# Patient Record
Sex: FEMALE | Race: BLACK OR AFRICAN AMERICAN | NOT HISPANIC OR LATINO | Employment: OTHER | RURAL
[De-identification: names, ages, dates, MRNs, and addresses within clinical notes are randomized per-mention and may not be internally consistent; named-entity substitution may affect disease eponyms.]

---

## 2021-05-17 ENCOUNTER — HOSPITAL ENCOUNTER (OUTPATIENT)
Dept: RADIOLOGY | Facility: HOSPITAL | Age: 76
Discharge: HOME | End: 2021-05-17
Attending: ORTHOPAEDIC SURGERY
Payer: COMMERCIAL

## 2021-05-17 DIAGNOSIS — M25.561 ACUTE PAIN OF RIGHT KNEE: ICD-10-CM

## 2021-05-17 PROCEDURE — 73564 X-RAY EXAM KNEE 4 OR MORE: CPT | Mod: TC,RT

## 2021-05-24 PROBLEM — S82.031D CLOSED DISPLACED TRANSVERSE FRACTURE OF RIGHT PATELLA WITH ROUTINE HEALING: Status: ACTIVE | Noted: 2021-05-24

## 2021-05-24 PROBLEM — Z47.89 ENCOUNTER FOR ORTHOPEDIC FOLLOW-UP CARE: Status: ACTIVE | Noted: 2021-05-24

## 2021-06-07 ENCOUNTER — HOSPITAL ENCOUNTER (OUTPATIENT)
Dept: RADIOLOGY | Facility: HOSPITAL | Age: 76
Discharge: HOME | End: 2021-06-07
Attending: NURSE PRACTITIONER
Payer: COMMERCIAL

## 2021-06-07 DIAGNOSIS — Z78.0 ASYMPTOMATIC AGE-RELATED POSTMENOPAUSAL STATE: ICD-10-CM

## 2021-06-07 PROCEDURE — 77080 DXA BONE DENSITY AXIAL: CPT | Mod: TC

## 2021-06-09 PROBLEM — T84.84XA PAINFUL ORTHOPAEDIC HARDWARE: Status: ACTIVE | Noted: 2021-06-09

## 2021-06-10 ENCOUNTER — ANESTHESIA (OUTPATIENT)
Dept: SURGERY | Facility: HOSPITAL | Age: 76
End: 2021-06-10
Payer: COMMERCIAL

## 2021-06-10 ENCOUNTER — ANESTHESIA EVENT (OUTPATIENT)
Dept: SURGERY | Facility: HOSPITAL | Age: 76
End: 2021-06-10
Payer: COMMERCIAL

## 2021-06-10 ENCOUNTER — HOSPITAL ENCOUNTER (OUTPATIENT)
Facility: HOSPITAL | Age: 76
Discharge: HOME | End: 2021-06-10
Attending: ORTHOPAEDIC SURGERY | Admitting: ORTHOPAEDIC SURGERY
Payer: COMMERCIAL

## 2021-06-10 VITALS
HEART RATE: 77 BPM | SYSTOLIC BLOOD PRESSURE: 152 MMHG | HEIGHT: 62 IN | DIASTOLIC BLOOD PRESSURE: 68 MMHG | BODY MASS INDEX: 22.63 KG/M2 | RESPIRATION RATE: 16 BRPM | WEIGHT: 123 LBS | OXYGEN SATURATION: 98 % | TEMPERATURE: 97 F

## 2021-06-10 DIAGNOSIS — S82.031D CLOSED DISPLACED TRANSVERSE FRACTURE OF RIGHT PATELLA WITH ROUTINE HEALING: ICD-10-CM

## 2021-06-10 DIAGNOSIS — I10 HTN (HYPERTENSION): ICD-10-CM

## 2021-06-10 DIAGNOSIS — T84.84XA PAINFUL ORTHOPAEDIC HARDWARE: ICD-10-CM

## 2021-06-10 DIAGNOSIS — Z47.89 ENCOUNTER FOR ORTHOPEDIC FOLLOW-UP CARE: ICD-10-CM

## 2021-06-10 LAB
GLUCOSE SERPL-MCNC: 125 MG/DL (ref 70–105)
GLUCOSE SERPL-MCNC: 63 MG/DL (ref 70–105)
GLUCOSE SERPL-MCNC: 85 MG/DL (ref 70–105)
GLUCOSE SERPL-MCNC: 86 MG/DL (ref 70–105)

## 2021-06-10 PROCEDURE — 27000177 HC AIRWAY, LARYNGEAL MASK: Performed by: ANESTHESIOLOGY

## 2021-06-10 PROCEDURE — 27000655: Performed by: ANESTHESIOLOGY

## 2021-06-10 PROCEDURE — 71000016 HC POSTOP RECOV ADDL HR: Performed by: ORTHOPAEDIC SURGERY

## 2021-06-10 PROCEDURE — 93010 EKG 12-LEAD: ICD-10-PCS | Mod: ,,, | Performed by: INTERNAL MEDICINE

## 2021-06-10 PROCEDURE — 37000009 HC ANESTHESIA EA ADD 15 MINS: Performed by: ORTHOPAEDIC SURGERY

## 2021-06-10 PROCEDURE — 25000003 PHARM REV CODE 250: Performed by: ORTHOPAEDIC SURGERY

## 2021-06-10 PROCEDURE — D9220A PRA ANESTHESIA: ICD-10-PCS | Mod: ANES,,, | Performed by: ANESTHESIOLOGY

## 2021-06-10 PROCEDURE — 25000003 PHARM REV CODE 250: Performed by: NURSE ANESTHETIST, CERTIFIED REGISTERED

## 2021-06-10 PROCEDURE — 27000510 HC BLANKET BAIR HUGGER ANY SIZE: Performed by: ANESTHESIOLOGY

## 2021-06-10 PROCEDURE — 36000707: Performed by: ORTHOPAEDIC SURGERY

## 2021-06-10 PROCEDURE — 36000706: Performed by: ORTHOPAEDIC SURGERY

## 2021-06-10 PROCEDURE — 37000008 HC ANESTHESIA 1ST 15 MINUTES: Performed by: ORTHOPAEDIC SURGERY

## 2021-06-10 PROCEDURE — D9220A PRA ANESTHESIA: ICD-10-PCS | Mod: CRNA,,, | Performed by: NURSE ANESTHETIST, CERTIFIED REGISTERED

## 2021-06-10 PROCEDURE — 27000716 HC OXISENSOR PROBE, ANY SIZE: Performed by: ANESTHESIOLOGY

## 2021-06-10 PROCEDURE — D9220A PRA ANESTHESIA: Mod: CRNA,,, | Performed by: NURSE ANESTHETIST, CERTIFIED REGISTERED

## 2021-06-10 PROCEDURE — 82962 GLUCOSE BLOOD TEST: CPT

## 2021-06-10 PROCEDURE — 71000015 HC POSTOP RECOV 1ST HR: Performed by: ORTHOPAEDIC SURGERY

## 2021-06-10 PROCEDURE — 27000260 *HC AIRWAY ORAL: Performed by: ANESTHESIOLOGY

## 2021-06-10 PROCEDURE — 71000033 HC RECOVERY, INTIAL HOUR: Performed by: ORTHOPAEDIC SURGERY

## 2021-06-10 PROCEDURE — 63600175 PHARM REV CODE 636 W HCPCS: Performed by: NURSE ANESTHETIST, CERTIFIED REGISTERED

## 2021-06-10 PROCEDURE — D9220A PRA ANESTHESIA: Mod: ANES,,, | Performed by: ANESTHESIOLOGY

## 2021-06-10 PROCEDURE — 93010 ELECTROCARDIOGRAM REPORT: CPT | Mod: ,,, | Performed by: INTERNAL MEDICINE

## 2021-06-10 PROCEDURE — 93005 ELECTROCARDIOGRAM TRACING: CPT

## 2021-06-10 PROCEDURE — 25000003 PHARM REV CODE 250: Performed by: ANESTHESIOLOGY

## 2021-06-10 PROCEDURE — 63600175 PHARM REV CODE 636 W HCPCS: Performed by: ANESTHESIOLOGY

## 2021-06-10 PROCEDURE — 27201423 OPTIME MED/SURG SUP & DEVICES STERILE SUPPLY: Performed by: ORTHOPAEDIC SURGERY

## 2021-06-10 RX ORDER — ONDANSETRON 2 MG/ML
4 INJECTION INTRAMUSCULAR; INTRAVENOUS DAILY PRN
Status: DISCONTINUED | OUTPATIENT
Start: 2021-06-10 | End: 2021-06-10 | Stop reason: HOSPADM

## 2021-06-10 RX ORDER — FENTANYL CITRATE 50 UG/ML
INJECTION, SOLUTION INTRAMUSCULAR; INTRAVENOUS
Status: DISCONTINUED | OUTPATIENT
Start: 2021-06-10 | End: 2021-06-10

## 2021-06-10 RX ORDER — CEFAZOLIN SODIUM 1 G/3ML
INJECTION, POWDER, FOR SOLUTION INTRAMUSCULAR; INTRAVENOUS
Status: DISCONTINUED | OUTPATIENT
Start: 2021-06-10 | End: 2021-06-10

## 2021-06-10 RX ORDER — PROPOFOL 10 MG/ML
VIAL (ML) INTRAVENOUS
Status: DISCONTINUED | OUTPATIENT
Start: 2021-06-10 | End: 2021-06-10

## 2021-06-10 RX ORDER — SODIUM CHLORIDE, SODIUM LACTATE, POTASSIUM CHLORIDE, CALCIUM CHLORIDE 600; 310; 30; 20 MG/100ML; MG/100ML; MG/100ML; MG/100ML
INJECTION, SOLUTION INTRAVENOUS CONTINUOUS
Status: DISCONTINUED | OUTPATIENT
Start: 2021-06-10 | End: 2021-06-10 | Stop reason: HOSPADM

## 2021-06-10 RX ORDER — DEXAMETHASONE SODIUM PHOSPHATE 100 MG/10ML
INJECTION INTRAMUSCULAR; INTRAVENOUS
Status: DISCONTINUED | OUTPATIENT
Start: 2021-06-10 | End: 2021-06-10

## 2021-06-10 RX ORDER — SODIUM CHLORIDE, SODIUM LACTATE, POTASSIUM CHLORIDE, CALCIUM CHLORIDE 600; 310; 30; 20 MG/100ML; MG/100ML; MG/100ML; MG/100ML
125 INJECTION, SOLUTION INTRAVENOUS CONTINUOUS
Status: DISCONTINUED | OUTPATIENT
Start: 2021-06-10 | End: 2021-06-10 | Stop reason: HOSPADM

## 2021-06-10 RX ORDER — TRAMADOL HYDROCHLORIDE 50 MG/1
50 TABLET ORAL EVERY 8 HOURS PRN
Qty: 30 TABLET | Refills: 0 | Status: SHIPPED | OUTPATIENT
Start: 2021-06-10

## 2021-06-10 RX ORDER — SODIUM CHLORIDE 9 MG/ML
INJECTION, SOLUTION INTRAVENOUS CONTINUOUS
Status: DISPENSED | OUTPATIENT
Start: 2021-06-10

## 2021-06-10 RX ORDER — ONDANSETRON 4 MG/1
8 TABLET, ORALLY DISINTEGRATING ORAL EVERY 8 HOURS PRN
Status: DISCONTINUED | OUTPATIENT
Start: 2021-06-10 | End: 2021-06-10 | Stop reason: HOSPADM

## 2021-06-10 RX ORDER — ONDANSETRON 2 MG/ML
INJECTION INTRAMUSCULAR; INTRAVENOUS
Status: DISCONTINUED | OUTPATIENT
Start: 2021-06-10 | End: 2021-06-10

## 2021-06-10 RX ORDER — LIDOCAINE HYDROCHLORIDE 20 MG/ML
INJECTION, SOLUTION EPIDURAL; INFILTRATION; INTRACAUDAL; PERINEURAL
Status: DISCONTINUED | OUTPATIENT
Start: 2021-06-10 | End: 2021-06-10

## 2021-06-10 RX ORDER — LIDOCAINE HYDROCHLORIDE 10 MG/ML
1 INJECTION, SOLUTION EPIDURAL; INFILTRATION; INTRACAUDAL; PERINEURAL ONCE
Status: DISCONTINUED | OUTPATIENT
Start: 2021-06-10 | End: 2021-06-10 | Stop reason: HOSPADM

## 2021-06-10 RX ORDER — HYDROCODONE BITARTRATE AND ACETAMINOPHEN 5; 325 MG/1; MG/1
1 TABLET ORAL EVERY 4 HOURS PRN
Status: DISCONTINUED | OUTPATIENT
Start: 2021-06-10 | End: 2021-06-10 | Stop reason: HOSPADM

## 2021-06-10 RX ORDER — CEFAZOLIN SODIUM 2 G/50ML
2 SOLUTION INTRAVENOUS
Status: ACTIVE | OUTPATIENT
Start: 2021-06-10

## 2021-06-10 RX ORDER — OXYCODONE HYDROCHLORIDE 5 MG/1
5 TABLET ORAL
Status: DISCONTINUED | OUTPATIENT
Start: 2021-06-10 | End: 2021-06-10 | Stop reason: HOSPADM

## 2021-06-10 RX ORDER — PROMETHAZINE HYDROCHLORIDE 25 MG/1
25 TABLET ORAL EVERY 6 HOURS PRN
Status: DISCONTINUED | OUTPATIENT
Start: 2021-06-10 | End: 2021-06-10 | Stop reason: HOSPADM

## 2021-06-10 RX ORDER — DEXTROSE 50 % IN WATER (D50W) INTRAVENOUS SYRINGE
25 ONCE
Status: COMPLETED | OUTPATIENT
Start: 2021-06-10 | End: 2021-06-10

## 2021-06-10 RX ORDER — MORPHINE SULFATE 10 MG/ML
4 INJECTION INTRAMUSCULAR; INTRAVENOUS; SUBCUTANEOUS EVERY 5 MIN PRN
Status: DISCONTINUED | OUTPATIENT
Start: 2021-06-10 | End: 2021-06-10 | Stop reason: HOSPADM

## 2021-06-10 RX ORDER — DIPHENHYDRAMINE HYDROCHLORIDE 50 MG/ML
25 INJECTION INTRAMUSCULAR; INTRAVENOUS EVERY 6 HOURS PRN
Status: DISCONTINUED | OUTPATIENT
Start: 2021-06-10 | End: 2021-06-10 | Stop reason: HOSPADM

## 2021-06-10 RX ORDER — HYDROMORPHONE HYDROCHLORIDE 2 MG/ML
0.5 INJECTION, SOLUTION INTRAMUSCULAR; INTRAVENOUS; SUBCUTANEOUS EVERY 5 MIN PRN
Status: DISCONTINUED | OUTPATIENT
Start: 2021-06-10 | End: 2021-06-10 | Stop reason: HOSPADM

## 2021-06-10 RX ORDER — ACETAMINOPHEN 500 MG
1000 TABLET ORAL EVERY 6 HOURS PRN
Status: DISCONTINUED | OUTPATIENT
Start: 2021-06-10 | End: 2021-06-10 | Stop reason: HOSPADM

## 2021-06-10 RX ORDER — PHENYLEPHRINE HYDROCHLORIDE 10 MG/ML
INJECTION INTRAVENOUS
Status: DISCONTINUED | OUTPATIENT
Start: 2021-06-10 | End: 2021-06-10

## 2021-06-10 RX ORDER — MEPERIDINE HYDROCHLORIDE 25 MG/ML
25 INJECTION INTRAMUSCULAR; INTRAVENOUS; SUBCUTANEOUS EVERY 10 MIN PRN
Status: DISCONTINUED | OUTPATIENT
Start: 2021-06-10 | End: 2021-06-10 | Stop reason: HOSPADM

## 2021-06-10 RX ORDER — DEXTROSE 50 % IN WATER (D50W) INTRAVENOUS SYRINGE
12.5 ONCE
Status: DISCONTINUED | OUTPATIENT
Start: 2021-06-10 | End: 2021-06-10 | Stop reason: HOSPADM

## 2021-06-10 RX ADMIN — PHENYLEPHRINE HYDROCHLORIDE 100 MCG: 10 INJECTION INTRAVENOUS at 05:06

## 2021-06-10 RX ADMIN — FENTANYL CITRATE 25 MCG: 50 INJECTION INTRAMUSCULAR; INTRAVENOUS at 05:06

## 2021-06-10 RX ADMIN — SODIUM CHLORIDE: 9 INJECTION, SOLUTION INTRAVENOUS at 01:06

## 2021-06-10 RX ADMIN — CEFAZOLIN 2 G: 1 INJECTION, POWDER, FOR SOLUTION INTRAMUSCULAR; INTRAVENOUS; PARENTERAL at 05:06

## 2021-06-10 RX ADMIN — PROPOFOL 100 MG: 10 INJECTION, EMULSION INTRAVENOUS at 05:06

## 2021-06-10 RX ADMIN — LIDOCAINE HYDROCHLORIDE 50 MG: 20 INJECTION, SOLUTION INTRAVENOUS at 05:06

## 2021-06-10 RX ADMIN — SODIUM CHLORIDE: 9 INJECTION, SOLUTION INTRAVENOUS at 05:06

## 2021-06-10 RX ADMIN — DEXTROSE MONOHYDRATE 12.5 G: 25 INJECTION, SOLUTION INTRAVENOUS at 02:06

## 2021-06-10 RX ADMIN — ONDANSETRON 4 MG: 2 INJECTION INTRAMUSCULAR; INTRAVENOUS at 05:06

## 2021-06-10 RX ADMIN — DEXAMETHASONE SODIUM PHOSPHATE 8 MG: 10 INJECTION INTRAMUSCULAR; INTRAVENOUS at 05:06

## 2021-06-10 RX ADMIN — HYDROMORPHONE HYDROCHLORIDE 0.5 MG: 2 INJECTION INTRAMUSCULAR; INTRAVENOUS; SUBCUTANEOUS at 05:06

## 2021-07-07 ENCOUNTER — HOSPITAL ENCOUNTER (OUTPATIENT)
Dept: RADIOLOGY | Facility: HOSPITAL | Age: 76
Discharge: HOME | End: 2021-07-07
Attending: ORTHOPAEDIC SURGERY
Payer: COMMERCIAL

## 2021-07-07 DIAGNOSIS — Z47.89 ENCOUNTER FOR ORTHOPEDIC FOLLOW-UP CARE: ICD-10-CM

## 2021-07-07 PROBLEM — S82.031D CLOSED DISPLACED TRANSVERSE FRACTURE OF RIGHT PATELLA WITH ROUTINE HEALING: Status: RESOLVED | Noted: 2021-05-24 | Resolved: 2021-07-07

## 2021-07-07 PROBLEM — T84.84XA PAINFUL ORTHOPAEDIC HARDWARE: Status: RESOLVED | Noted: 2021-06-09 | Resolved: 2021-07-07

## 2021-07-07 PROCEDURE — 73560 X-RAY EXAM OF KNEE 1 OR 2: CPT | Mod: TC,RT

## 2022-01-11 ENCOUNTER — HOSPITAL ENCOUNTER (OUTPATIENT)
Dept: RADIOLOGY | Facility: HOSPITAL | Age: 77
Discharge: HOME | End: 2022-01-11
Attending: NURSE PRACTITIONER
Payer: COMMERCIAL

## 2022-01-11 VITALS — BODY MASS INDEX: 22.63 KG/M2 | HEIGHT: 62 IN | WEIGHT: 123 LBS

## 2022-01-11 DIAGNOSIS — Z12.31 BREAST CANCER SCREENING BY MAMMOGRAM: ICD-10-CM

## 2022-01-11 PROCEDURE — 77067 MAMMO DIGITAL SCREENING BILAT: ICD-10-PCS | Mod: 26,,, | Performed by: RADIOLOGY

## 2022-01-11 PROCEDURE — 77067 SCR MAMMO BI INCL CAD: CPT | Mod: TC

## 2022-01-11 PROCEDURE — 77067 SCR MAMMO BI INCL CAD: CPT | Mod: 26,,, | Performed by: RADIOLOGY

## 2022-02-23 ENCOUNTER — HOSPITAL ENCOUNTER (OUTPATIENT)
Dept: RADIOLOGY | Facility: HOSPITAL | Age: 77
Discharge: HOME | End: 2022-02-23
Attending: NURSE PRACTITIONER
Payer: COMMERCIAL

## 2022-02-23 DIAGNOSIS — R74.01 ELEVATION OF LEVELS OF LIVER TRANSAMINASE LEVELS: ICD-10-CM

## 2022-02-23 PROCEDURE — 76705 ECHO EXAM OF ABDOMEN: CPT | Mod: TC

## 2023-08-19 ENCOUNTER — HOSPITAL ENCOUNTER (EMERGENCY)
Facility: HOSPITAL | Age: 78
Discharge: HOME | End: 2023-08-19
Payer: MEDICARE

## 2023-08-19 VITALS
TEMPERATURE: 98 F | RESPIRATION RATE: 16 BRPM | SYSTOLIC BLOOD PRESSURE: 177 MMHG | HEIGHT: 62 IN | HEART RATE: 95 BPM | DIASTOLIC BLOOD PRESSURE: 86 MMHG | OXYGEN SATURATION: 98 % | WEIGHT: 127 LBS | BODY MASS INDEX: 23.37 KG/M2

## 2023-08-19 DIAGNOSIS — E16.2 HYPOGLYCEMIA: Primary | ICD-10-CM

## 2023-08-19 LAB
ALBUMIN SERPL BCP-MCNC: 2.6 G/DL (ref 3.5–5)
ALBUMIN/GLOB SERPL: 0.6 {RATIO}
ALP SERPL-CCNC: 164 U/L (ref 55–142)
ALT SERPL W P-5'-P-CCNC: 19 U/L (ref 13–56)
ANION GAP SERPL CALCULATED.3IONS-SCNC: 17 MMOL/L (ref 7–16)
AST SERPL W P-5'-P-CCNC: 40 U/L (ref 15–37)
BASOPHILS # BLD AUTO: 0.02 K/UL (ref 0–0.2)
BASOPHILS NFR BLD AUTO: 0.4 % (ref 0–1)
BILIRUB SERPL-MCNC: 0.3 MG/DL (ref ?–1.2)
BUN SERPL-MCNC: 25 MG/DL (ref 7–18)
BUN/CREAT SERPL: 12 (ref 6–20)
CALCIUM SERPL-MCNC: 8.3 MG/DL (ref 8.5–10.1)
CHLORIDE SERPL-SCNC: 103 MMOL/L (ref 98–107)
CK SERPL-CCNC: 163 U/L (ref 26–192)
CO2 SERPL-SCNC: 22 MMOL/L (ref 21–32)
CREAT SERPL-MCNC: 2.06 MG/DL (ref 0.55–1.02)
DIFFERENTIAL METHOD BLD: ABNORMAL
EGFR (NO RACE VARIABLE) (RUSH/TITUS): 25 ML/MIN/1.73M2
EOSINOPHIL # BLD AUTO: 0.08 K/UL (ref 0–0.5)
EOSINOPHIL NFR BLD AUTO: 1.5 % (ref 1–4)
ERYTHROCYTE [DISTWIDTH] IN BLOOD BY AUTOMATED COUNT: 13.2 % (ref 11.5–14.5)
GLOBULIN SER-MCNC: 4.1 G/DL (ref 2–4)
GLUCOSE SERPL-MCNC: 224 MG/DL (ref 74–106)
HCT VFR BLD AUTO: 31.2 % (ref 38–47)
HGB BLD-MCNC: 10 G/DL (ref 12–16)
LYMPHOCYTES # BLD AUTO: 0.77 K/UL (ref 1–4.8)
LYMPHOCYTES NFR BLD AUTO: 14.9 % (ref 27–41)
MAGNESIUM SERPL-MCNC: 1.7 MG/DL (ref 1.7–2.3)
MCH RBC QN AUTO: 30.1 PG (ref 27–31)
MCHC RBC AUTO-ENTMCNC: 32.1 G/DL (ref 32–36)
MCV RBC AUTO: 94 FL (ref 80–96)
MONOCYTES # BLD AUTO: 0.36 K/UL (ref 0–0.8)
MONOCYTES NFR BLD AUTO: 6.9 % (ref 2–6)
MPC BLD CALC-MCNC: 8.2 FL (ref 9.4–12.4)
NEUTROPHILS # BLD AUTO: 3.95 K/UL (ref 1.8–7.7)
NEUTROPHILS NFR BLD AUTO: 76.3 % (ref 53–65)
PLATELET # BLD AUTO: 204 K/UL (ref 150–400)
POTASSIUM SERPL-SCNC: 4 MMOL/L (ref 3.5–5.1)
PROT SERPL-MCNC: 6.7 G/DL (ref 6.4–8.2)
RBC # BLD AUTO: 3.32 M/UL (ref 4.2–5.4)
SODIUM SERPL-SCNC: 138 MMOL/L (ref 136–145)
WBC # BLD AUTO: 5.18 K/UL (ref 4.5–11)

## 2023-08-19 PROCEDURE — 82962 GLUCOSE BLOOD TEST: CPT

## 2023-08-19 PROCEDURE — 82550 ASSAY OF CK (CPK): CPT | Performed by: REGISTERED NURSE

## 2023-08-19 PROCEDURE — 99284 EMERGENCY DEPT VISIT MOD MDM: CPT | Mod: GF

## 2023-08-19 PROCEDURE — 85025 COMPLETE CBC W/AUTO DIFF WBC: CPT | Performed by: REGISTERED NURSE

## 2023-08-19 PROCEDURE — 83735 ASSAY OF MAGNESIUM: CPT | Performed by: REGISTERED NURSE

## 2023-08-19 PROCEDURE — 99283 EMERGENCY DEPT VISIT LOW MDM: CPT

## 2023-08-19 PROCEDURE — 80053 COMPREHEN METABOLIC PANEL: CPT | Performed by: REGISTERED NURSE

## 2023-08-20 LAB
GLUCOSE SERPL-MCNC: 168 MG/DL (ref 70–105)
GLUCOSE SERPL-MCNC: 223 MG/DL (ref 70–105)

## 2023-08-20 NOTE — ED TRIAGE NOTES
76 YO FE TO ER VIA EMS WITH HYPOGLYCEMIA.  EMS REPORTS PT WAS FOUND AT HOME WITH BGL OF 23, PROBABLY HAD BEEN ON FLOOR FOR OVER 3 HOURS.  D10 WAS HUNG EN ROUTE, UPON ARRIVAL PT BGL .  PT IS ALERT AND ORIENTED X 3, DENIES C/O PAIN OR DISCOMFORT.

## 2023-08-20 NOTE — ED NOTES
PT HAS CONSUMED ONE CHICKEN SALAD SANDWICH, ONE MANDARIN,HALF A BANANA,8 OZ OF TEA AND 8OZ CUP OF COFFEE,BLACK

## 2023-08-20 NOTE — ED PROVIDER NOTES
Encounter Date: 8/19/2023       History     Chief Complaint   Patient presents with    Hypoglycemia     Jeanie Lucas is a 78 yo AAF that presents per [facility] with hypoglycemia.  EMS reports pt was found on the floor by a family member with a blood glucose of 24 mg/dL for possibly 3 hours.  Pt was transported with a D10 infusion and on arrival to ED pt was AAO X 3 with blood glucose of 223 mg/dL.  Pt states she has not eaten since 1800 the day before.  She denies pain or discomfort.    The history is provided by the patient, a relative and the EMS personnel.    EMS   Review of patient's allergies indicates:  No Known Allergies  Past Medical History:   Diagnosis Date    Diabetes mellitus, type 2     Heart disease     Hypertension     Parkinson disease      Past Surgical History:   Procedure Laterality Date    CARDIAC DEFIBRILLATOR PLACEMENT  2018    COMBINED RIGHT AND TRANSSEPTAL (EXISTING OPENING) LEFT HEART CATHETERIZATION      defibrilator      HYSTERECTOMY      REMOVAL OF HARDWARE FROM LOWER EXTREMITY Right 6/10/2021    Procedure: REMOVAL, HARDWARE, LOWER EXTREMITY, PATELLA;  Surgeon: Artis Monaco MD;  Location: [facility];  Service: Orthopedics;  Laterality: Right;     Family History   Problem Relation Age of Onset    Diabetes Mother     Heart disease Mother     Heart disease Father      Social History     Tobacco Use    Smoking status: Never    Smokeless tobacco: Never   Substance Use Topics    Alcohol use: Never    Drug use: Never     Review of Systems   Respiratory:  Negative for shortness of breath.    Cardiovascular:  Negative for chest pain.   Gastrointestinal:  Negative for abdominal pain, nausea and vomiting.   Neurological:  Negative for dizziness, weakness and headaches.   All other systems reviewed and are negative.      Physical Exam     Initial Vitals [08/19/23 1856]   BP Pulse Resp Temp SpO2   (!) 177/86 95 16 97.8 °F (36.6 °C) 98 %      MAP       --         Physical  Exam    Constitutional: She appears well-developed and well-nourished. She is not diaphoretic. She is active and cooperative.  Non-toxic appearance. She does not have a sickly appearance. She does not appear ill. No distress.   HENT:   Head: Normocephalic and atraumatic.   Right Ear: External ear normal.   Left Ear: External ear normal.   Nose: Nose normal.   Eyes: EOM are normal. Pupils are equal, round, and reactive to light.   Neck: Neck supple.   Normal range of motion.  Cardiovascular:  Normal rate, regular rhythm, normal heart sounds and intact distal pulses.           Pulmonary/Chest: Breath sounds normal. No respiratory distress.   Abdominal: Abdomen is soft. Bowel sounds are normal.   Musculoskeletal:         General: Normal range of motion.      Cervical back: Normal range of motion and neck supple.     Neurological: She is alert and oriented to person, place, and time. She has normal strength. GCS score is 15. GCS eye subscore is 4. GCS verbal subscore is 5. GCS motor subscore is 6.   Skin: Skin is warm and dry. Capillary refill takes less than 2 seconds.   Psychiatric: She has a normal mood and affect. Her behavior is normal. Judgment and thought content normal.         Medical Screening Exam   See Full Note    ED Course   Procedures  Labs Reviewed   COMPREHENSIVE METABOLIC PANEL - Abnormal; Notable for the following components:       Result Value    Anion Gap 17 (*)     Glucose 224 (*)     BUN 25 (*)     Creatinine 2.06 (*)     Calcium 8.3 (*)     Albumin 2.6 (*)     Globulin 4.1 (*)     Alk Phos 164 (*)     AST 40 (*)     eGFR 25 (*)     All other components within normal limits   CBC WITH DIFFERENTIAL - Abnormal; Notable for the following components:    RBC 3.32 (*)     Hemoglobin 10.0 (*)     Hematocrit 31.2 (*)     MPV 8.2 (*)     Neutrophils % 76.3 (*)     Lymphocytes % 14.9 (*)     Lymphocytes, Absolute 0.77 (*)     Monocytes % 6.9 (*)     All other components within normal limits   MAGNESIUM -  Normal   CK - Normal   CBC W/ AUTO DIFFERENTIAL    Narrative:     The following orders were created for panel order CBC auto differential.  Procedure                               Abnormality         Status                     ---------                               -----------         ------                     CBC with Differential[[id number]]        Abnormal            Final result                 Please view results for these tests on the individual orders.          Imaging Results    None          Medications - No data to display  Medical Decision Making  78 yo AAF per [facility] with c/o hypoglycemic episode.  Pt was found on the floor by a family member with a blood sugar of 24 mg/dL.  Family informed EMS that pt might have been on the floor for 3 hours.  She was transported with D10 to ED.  On arrival pt was AAO x 3 with blood sugar of 223 mg/dL.     She was given a meal tray and observed for rebound hypoglycemia.  On her repeat accucheck prior to discharge her result was 168 mg/dL.  Daughter was at bedside.  Daughter states she has been trying to get her mother to eat but she has refused.  Instructed pt she must eat while taking medications.  She verbalized understanding and states she will start eating.  Daughter is staying with pt tonight and will ensure pt has a snack prior to bed.  Daughter also agrees to monitor pts blood sugar every 2 hours during the night.      Amount and/or Complexity of Data Reviewed  Labs: ordered.     Details: 08/19/23 1940  CK   Collected: 08/19/23 1909  Final result  Specimen: Blood       U/L       08/19/23 1927  Comprehensive Metabolic Panel   Collected: 08/19/23 1909  Final result  Specimen: Blood      Sodium 138 mmol/L  Potassium 4.0 mmol/L  Chloride 103 mmol/L  CO2 22 mmol/L  Anion Gap 17 High  mmol/L  Glucose 224 High  mg/dL  BUN 25 High  mg/dL  Creatinine 2.06 High  mg/dL  BUN/Creatinine Ratio 12  Calcium 8.3 Low  mg/dL  Total Protein 6.7 g/dL  Albumin 2.6 Low   g/dL  Globulin 4.1 High  g/dL  A/G Ratio 0.6  Bilirubin, Total 0.3 mg/dL  Alk Phos 164 High  U/L  ALT 19 U/L  AST 40 High  U/L  eGFR 25 Low  mL/min/1.73m2       08/19/23 1927  Magnesium   Collected: 08/19/23 1909  Final result  Specimen: Blood      Magnesium 1.7 mg/dL       08/19/23 1912  CBC auto differential   Collected: 08/19/23 1909  Final result  Specimen: Blood         08/19/23 1912  CBC with Differential   Collected: 08/19/23 1909  Final result  Specimen: Blood      WBC 5.18 K/uL  RBC 3.32 Low  M/uL  Hemoglobin 10.0 Low  g/dL  Hematocrit 31.2 Low  %  MCV 94.0 fL  MCH 30.1 pg  MCHC 32.1 g/dL  RDW 13.2 %  Platelet Count 204 K/uL  MPV 8.2 Low  fL  Neutrophils % 76.3 High  %  Lymphocytes % 14.9 Low  %  Neutrophils, Abs 3.95 K/uL  Lymphocytes, Absolute 0.77 Low  K/uL  Diff Type Auto  Monocytes % 6.9 High  %  Eosinophils % 1.5 %  Basophils % 0.4 %  Monocytes, Absolute 0.36 K/uL  Eosinophils, Absolute 0.08 K/uL  Basophils, Absolute 0.02 K/uL                               Clinical Impression:   Final diagnoses:  [E16.2] Hypoglycemia (Primary)        ED Disposition Condition    Discharge Stable          ED Prescriptions    None       Follow-up Information       Follow up With Specialties Details Why Contact Info    Arabella Ann, [facility] In 2 days For ED visit follow up for hypoglycemia [id number] [facility]  [place] [address]  [phone]               Madelyn Prabhakar, NP-C  08/20/23 9970

## 2023-08-22 ENCOUNTER — TELEPHONE (OUTPATIENT)
Dept: EMERGENCY MEDICINE | Facility: HOSPITAL | Age: 78
End: 2023-08-22
Payer: MEDICARE

## 2023-09-13 ENCOUNTER — HOSPITAL ENCOUNTER (EMERGENCY)
Facility: HOSPITAL | Age: 78
Discharge: LEFT BEFORE DISCHARGE | End: 2023-09-13
Payer: MEDICARE

## 2023-09-13 VITALS
OXYGEN SATURATION: 94 % | SYSTOLIC BLOOD PRESSURE: 176 MMHG | RESPIRATION RATE: 14 BRPM | TEMPERATURE: 98 F | HEART RATE: 101 BPM | DIASTOLIC BLOOD PRESSURE: 97 MMHG | BODY MASS INDEX: 20.85 KG/M2 | WEIGHT: 114 LBS

## 2023-09-13 DIAGNOSIS — R07.9 CHEST PAIN: ICD-10-CM

## 2023-09-13 DIAGNOSIS — R10.13 EPIGASTRIC PAIN: ICD-10-CM

## 2023-09-13 DIAGNOSIS — I50.9 CONGESTIVE HEART FAILURE, UNSPECIFIED HF CHRONICITY, UNSPECIFIED HEART FAILURE TYPE: Primary | ICD-10-CM

## 2023-09-13 DIAGNOSIS — R79.89 ELEVATED TROPONIN: ICD-10-CM

## 2023-09-13 DIAGNOSIS — R10.9 ABDOMINAL PAIN: ICD-10-CM

## 2023-09-13 LAB
ALBUMIN SERPL BCP-MCNC: 3.7 G/DL (ref 3.5–5)
ALBUMIN/GLOB SERPL: 0.8 {RATIO}
ALP SERPL-CCNC: 221 U/L (ref 55–142)
ALT SERPL W P-5'-P-CCNC: 21 U/L (ref 13–56)
ANION GAP SERPL CALCULATED.3IONS-SCNC: 22 MMOL/L (ref 7–16)
AST SERPL W P-5'-P-CCNC: 25 U/L (ref 15–37)
BASOPHILS # BLD AUTO: 0.03 K/UL (ref 0–0.2)
BASOPHILS NFR BLD AUTO: 0.4 % (ref 0–1)
BILIRUB SERPL-MCNC: 0.7 MG/DL (ref ?–1.2)
BUN SERPL-MCNC: 32 MG/DL (ref 7–18)
BUN/CREAT SERPL: 14 (ref 6–20)
CALCIUM SERPL-MCNC: 9.5 MG/DL (ref 8.5–10.1)
CHLORIDE SERPL-SCNC: 103 MMOL/L (ref 98–107)
CO2 SERPL-SCNC: 22 MMOL/L (ref 21–32)
CREAT SERPL-MCNC: 2.32 MG/DL (ref 0.55–1.02)
DIFFERENTIAL METHOD BLD: ABNORMAL
EGFR (NO RACE VARIABLE) (RUSH/TITUS): 21 ML/MIN/1.73M2
EOSINOPHIL # BLD AUTO: 0.01 K/UL (ref 0–0.5)
EOSINOPHIL NFR BLD AUTO: 0.1 % (ref 1–4)
ERYTHROCYTE [DISTWIDTH] IN BLOOD BY AUTOMATED COUNT: 14.4 % (ref 11.5–14.5)
GLOBULIN SER-MCNC: 4.5 G/DL (ref 2–4)
GLUCOSE SERPL-MCNC: 296 MG/DL (ref 74–106)
HCT VFR BLD AUTO: 35.9 % (ref 38–47)
HGB BLD-MCNC: 11.1 G/DL (ref 12–16)
LIPASE SERPL-CCNC: 175 U/L (ref 73–393)
LYMPHOCYTES # BLD AUTO: 0.77 K/UL (ref 1–4.8)
LYMPHOCYTES NFR BLD AUTO: 10.5 % (ref 27–41)
LYMPHOCYTES NFR BLD MANUAL: 3 % (ref 27–41)
MAGNESIUM SERPL-MCNC: 2.3 MG/DL (ref 1.7–2.3)
MCH RBC QN AUTO: 28.9 PG (ref 27–31)
MCHC RBC AUTO-ENTMCNC: 30.9 G/DL (ref 32–36)
MCV RBC AUTO: 93.5 FL (ref 80–96)
MONOCYTES # BLD AUTO: 0.26 K/UL (ref 0–0.8)
MONOCYTES NFR BLD AUTO: 3.5 % (ref 2–6)
MONOCYTES NFR BLD MANUAL: 3 % (ref 2–6)
MPC BLD CALC-MCNC: 9.9 FL (ref 9.4–12.4)
NEUTROPHILS # BLD AUTO: 6.29 K/UL (ref 1.8–7.7)
NEUTROPHILS NFR BLD AUTO: 85.5 % (ref 53–65)
NEUTS SEG NFR BLD MANUAL: 94 % (ref 50–62)
NRBC BLD MANUAL-RTO: ABNORMAL %
NT-PROBNP SERPL-MCNC: ABNORMAL PG/ML (ref 1–450)
PLATELET # BLD AUTO: 244 K/UL (ref 150–400)
POTASSIUM SERPL-SCNC: 3.9 MMOL/L (ref 3.5–5.1)
PROT SERPL-MCNC: 8.2 G/DL (ref 6.4–8.2)
RBC # BLD AUTO: 3.84 M/UL (ref 4.2–5.4)
SODIUM SERPL-SCNC: 143 MMOL/L (ref 136–145)
TROPONIN I SERPL DL<=0.01 NG/ML-MCNC: 200.5 PG/ML
TROPONIN I SERPL DL<=0.01 NG/ML-MCNC: 217.3 PG/ML
WBC # BLD AUTO: 7.36 K/UL (ref 4.5–11)

## 2023-09-13 PROCEDURE — 93010 EKG 12-LEAD: ICD-10-PCS | Mod: ,,, | Performed by: STUDENT IN AN ORGANIZED HEALTH CARE EDUCATION/TRAINING PROGRAM

## 2023-09-13 PROCEDURE — 99285 EMERGENCY DEPT VISIT HI MDM: CPT | Mod: GF,25

## 2023-09-13 PROCEDURE — 85025 COMPLETE CBC W/AUTO DIFF WBC: CPT | Performed by: REGISTERED NURSE

## 2023-09-13 PROCEDURE — 93010 ELECTROCARDIOGRAM REPORT: CPT | Mod: ,,, | Performed by: STUDENT IN AN ORGANIZED HEALTH CARE EDUCATION/TRAINING PROGRAM

## 2023-09-13 PROCEDURE — 80053 COMPREHEN METABOLIC PANEL: CPT | Performed by: REGISTERED NURSE

## 2023-09-13 PROCEDURE — 83735 ASSAY OF MAGNESIUM: CPT | Performed by: REGISTERED NURSE

## 2023-09-13 PROCEDURE — 96361 HYDRATE IV INFUSION ADD-ON: CPT

## 2023-09-13 PROCEDURE — 96375 TX/PRO/DX INJ NEW DRUG ADDON: CPT

## 2023-09-13 PROCEDURE — 84484 ASSAY OF TROPONIN QUANT: CPT | Mod: 91 | Performed by: REGISTERED NURSE

## 2023-09-13 PROCEDURE — 83690 ASSAY OF LIPASE: CPT | Performed by: REGISTERED NURSE

## 2023-09-13 PROCEDURE — 96376 TX/PRO/DX INJ SAME DRUG ADON: CPT

## 2023-09-13 PROCEDURE — 93005 ELECTROCARDIOGRAM TRACING: CPT

## 2023-09-13 PROCEDURE — 99285 EMERGENCY DEPT VISIT HI MDM: CPT | Mod: 25

## 2023-09-13 PROCEDURE — 63600175 PHARM REV CODE 636 W HCPCS: Performed by: REGISTERED NURSE

## 2023-09-13 PROCEDURE — 83880 ASSAY OF NATRIURETIC PEPTIDE: CPT | Performed by: REGISTERED NURSE

## 2023-09-13 PROCEDURE — 25000003 PHARM REV CODE 250: Performed by: REGISTERED NURSE

## 2023-09-13 PROCEDURE — 96374 THER/PROPH/DIAG INJ IV PUSH: CPT

## 2023-09-13 RX ORDER — FAMOTIDINE 10 MG/ML
20 INJECTION INTRAVENOUS
Status: COMPLETED | OUTPATIENT
Start: 2023-09-13 | End: 2023-09-13

## 2023-09-13 RX ORDER — FUROSEMIDE 40 MG/1
40 TABLET ORAL 2 TIMES DAILY
Qty: 4 TABLET | Refills: 0 | Status: SHIPPED | OUTPATIENT
Start: 2023-09-13 | End: 2023-09-15

## 2023-09-13 RX ORDER — FUROSEMIDE 10 MG/ML
20 INJECTION INTRAMUSCULAR; INTRAVENOUS
Status: COMPLETED | OUTPATIENT
Start: 2023-09-13 | End: 2023-09-13

## 2023-09-13 RX ORDER — FUROSEMIDE 10 MG/ML
40 INJECTION INTRAMUSCULAR; INTRAVENOUS
Status: COMPLETED | OUTPATIENT
Start: 2023-09-13 | End: 2023-09-13

## 2023-09-13 RX ORDER — ASPIRIN 81 MG/1
81 TABLET ORAL DAILY
COMMUNITY

## 2023-09-13 RX ORDER — SERTRALINE HYDROCHLORIDE 100 MG/1
100 TABLET, FILM COATED ORAL DAILY
COMMUNITY

## 2023-09-13 RX ORDER — ONDANSETRON 2 MG/ML
4 INJECTION INTRAMUSCULAR; INTRAVENOUS
Status: COMPLETED | OUTPATIENT
Start: 2023-09-13 | End: 2023-09-13

## 2023-09-13 RX ADMIN — ONDANSETRON 4 MG: 2 INJECTION INTRAMUSCULAR; INTRAVENOUS at 09:09

## 2023-09-13 RX ADMIN — FUROSEMIDE 40 MG: 10 INJECTION, SOLUTION INTRAMUSCULAR; INTRAVENOUS at 09:09

## 2023-09-13 RX ADMIN — FAMOTIDINE 20 MG: 10 INJECTION INTRAVENOUS at 09:09

## 2023-09-13 RX ADMIN — SODIUM CHLORIDE, POTASSIUM CHLORIDE, SODIUM LACTATE AND CALCIUM CHLORIDE 500 ML: 600; 310; 30; 20 INJECTION, SOLUTION INTRAVENOUS at 08:09

## 2023-09-13 RX ADMIN — NITROGLYCERIN 0.5 INCH: 20 OINTMENT TOPICAL at 08:09

## 2023-09-13 RX ADMIN — FUROSEMIDE 20 MG: 10 INJECTION, SOLUTION INTRAMUSCULAR; INTRAVENOUS at 10:09

## 2023-09-14 ENCOUNTER — TELEPHONE (OUTPATIENT)
Dept: EMERGENCY MEDICINE | Facility: HOSPITAL | Age: 78
End: 2023-09-14
Payer: MEDICARE

## 2023-09-14 NOTE — TELEPHONE ENCOUNTER
Patient called to get records faxed to her heart doctor. Patient has a follow up appointment this morning.

## 2023-09-14 NOTE — ED PROVIDER NOTES
"Encounter Date: 9/13/2023       History     Chief Complaint   Patient presents with    Abdominal Pain    Vomiting    Diarrhea     C/O loose stools since yesterday, only 1 loose stool today but started vomiting this morning and has vomited x4 since 1 pm.  C/O upper abd. Having a burning sensation.     78 y-old AAF received to ED with complaint of N/V/D that has been ongoing since Saturday 09/09/2023. Patient states that her symptoms were initially just diarrhea with TNTC on Saturday, fewer on Sunday and 1-2 per day since. States that her vomiting started today 09/13/2023. States "I just couldn't  keep anything down." States that she has been able to keep water down over the past few hours. C/o epigastric pain that is described as a "burning" type pain that is resolved on exam. No there complaints voiced. History of CABG 5 years PTA by Dr. Madrid at [facility] in [facility]. Patient of Dr. Crowe.         Review of patient's allergies indicates:  No Known Allergies  Past Medical History:   Diagnosis Date    Diabetes mellitus, type 2     Heart disease     Hypertension     Parkinson disease      Past Surgical History:   Procedure Laterality Date    CARDIAC DEFIBRILLATOR PLACEMENT  2018    COMBINED RIGHT AND TRANSSEPTAL (EXISTING OPENING) LEFT HEART CATHETERIZATION      defibrilator      HYSTERECTOMY      REMOVAL OF HARDWARE FROM LOWER EXTREMITY Right 6/10/2021    Procedure: REMOVAL, HARDWARE, LOWER EXTREMITY, PATELLA;  Surgeon: Artis Monaco MD;  Location: [facility];  Service: Orthopedics;  Laterality: Right;     Family History   Problem Relation Age of Onset    Diabetes Mother     Heart disease Mother     Heart disease Father      Social History     Tobacco Use    Smoking status: Never    Smokeless tobacco: Never   Substance Use Topics    Alcohol use: Never    Drug use: Never     Review of Systems   Constitutional:  Positive for activity change (Decreased), appetite change, fatigue and fever. " "  HENT: Negative.     Eyes: Negative.    Respiratory: Negative.     Cardiovascular:  Positive for chest pain (Epigastric pain described as a "burning.").   Gastrointestinal: Negative.    Endocrine: Negative.    Genitourinary: Negative.    Musculoskeletal: Negative.    Skin: Negative.    Allergic/Immunologic: Negative.    Neurological: Negative.    Hematological: Negative.    Psychiatric/Behavioral: Negative.         Physical Exam     Initial Vitals [09/13/23 1815]   BP Pulse Resp Temp SpO2   (!) 159/91 96 16 98.2 °F (36.8 °C) 98 %      MAP       --         Physical Exam    Nursing note and vitals reviewed.  Constitutional: She appears well-developed and well-nourished.   HENT:   Head: Normocephalic.   Right Ear: External ear normal.   Left Ear: External ear normal.   Nose: Nose normal.   Mouth/Throat: Oropharynx is clear and moist.   Eyes: Conjunctivae are normal.   Neck: Neck supple.   Normal range of motion.  Cardiovascular:  Normal rate, regular rhythm, normal heart sounds and intact distal pulses.           Pulmonary/Chest: Breath sounds normal.   Abdominal: Abdomen is soft. Bowel sounds are normal.   Musculoskeletal:         General: Normal range of motion.      Cervical back: Normal range of motion and neck supple.     Neurological: She is alert and oriented to person, place, and time. She has normal strength. GCS score is 15. GCS eye subscore is 4. GCS verbal subscore is 5. GCS motor subscore is 6.   Skin: Skin is warm and dry. Capillary refill takes less than 2 seconds.   Psychiatric: She has a normal mood and affect. Her behavior is normal. Judgment and thought content normal.         Medical Screening Exam   See Full Note    ED Course   Procedures  Labs Reviewed   COMPREHENSIVE METABOLIC PANEL - Abnormal; Notable for the following components:       Result Value    Anion Gap 22 (*)     Glucose 296 (*)     BUN 32 (*)     Creatinine 2.32 (*)     Globulin 4.5 (*)     Alk Phos 221 (*)     eGFR 21 (*)     All " other components within normal limits   TROPONIN I - Abnormal; Notable for the following components:    Troponin I High Sensitivity 217.3 (*)     All other components within normal limits   CBC WITH DIFFERENTIAL - Abnormal; Notable for the following components:    RBC 3.84 (*)     Hemoglobin 11.1 (*)     Hematocrit 35.9 (*)     MCHC 30.9 (*)     Neutrophils % 85.5 (*)     Lymphocytes % 10.5 (*)     Lymphocytes, Absolute 0.77 (*)     Eosinophils % 0.1 (*)     All other components within normal limits   MANUAL DIFFERENTIAL - Abnormal; Notable for the following components:    Segmented Neutrophils, Man % 94 (*)     Lymphocytes, Man % 3 (*)     All other components within normal limits   TROPONIN I - Abnormal; Notable for the following components:    Troponin I High Sensitivity 200.5 (*)     All other components within normal limits   NT-PRO NATRIURETIC PEPTIDE - Abnormal; Notable for the following components:    ProBNP 33,780 (*)     All other components within normal limits   LIPASE - Normal   MAGNESIUM - Normal   CBC W/ AUTO DIFFERENTIAL    Narrative:     The following orders were created for panel order CBC auto differential.  Procedure                               Abnormality         Status                     ---------                               -----------         ------                     CBC with Differential[[id number]]        Abnormal            Final result               Manual Differential[[id number]]          Abnormal            Final result                 Please view results for these tests on the individual orders.   URINALYSIS, REFLEX TO URINE CULTURE        ECG Results              EKG 12-lead (In process)  Result time 09/13/23 21:35:45      In process by Interface, Lab In [facility] (09/13/23 21:35:45)                   Narrative:    Test Reason : R07.9,    Vent. Rate : 100 BPM     Atrial Rate : 100 BPM     P-R Int : 194 ms          QRS Dur : 120 ms      QT Int : 384 ms       P-R-T Axes : 073 -27 102  degrees     QTc Int : 495 ms    Normal sinus rhythm  Possible Left atrial enlargement  Left axis deviation  Right bundle branch block  LVH with repolarization abnormality  Inferior infarct (cited on or before 10-CRISTINO-2021)  Anterolateral infarct (cited on or before 13-SEP-2023)  Abnormal ECG  When compared with ECG of 13-SEP-2023 19:41,  No significant change was found    Referred By: [email or web address]   SELF           Confirmed By:                                      EKG 12-lead (In process)  Result time 09/13/23 19:45:38      In process by Interface, Lab In [facility] (09/13/23 19:45:38)                   Narrative:    Test Reason : R10.13,    Vent. Rate : 099 BPM     Atrial Rate : 099 BPM     P-R Int : 196 ms          QRS Dur : 118 ms      QT Int : 388 ms       P-R-T Axes : 073 -73 110 degrees     QTc Int : 497 ms    Normal sinus rhythm  Possible Left atrial enlargement  Left axis deviation  Right bundle branch block  LVH with repolarization abnormality  Possible Lateral infarct ,age undetermined  Inferior infarct (cited on or before 10-CRISTINO-2021)  Abnormal ECG  When compared with ECG of 10-CRISTINO-2021 14:53,  Questionable change in QRS duration  Borderline criteria for Lateral infarct are now Present    Referred By: [email or web address]   SELF           Confirmed By:                                   Imaging Results              X-Ray Abdomen Flat And Erect (Final result)  Result time 09/13/23 20:49:03      Final result by Lenard Good MD (09/13/23 20:49:03)                   Impression:      No definite acute abdominal process      Electronically signed by: Lenard Good  Date:    09/13/2023  Time:    20:49               Narrative:    EXAMINATION:  XR ABDOMEN FLAT AND ERECT    CLINICAL HISTORY:  .  Unspecified abdominal pain.  Vomiting.  Diarrhea    COMPARISON:  January 1, 2018    TECHNIQUE:  AP supine and erect views abdomen    FINDINGS:  There is no evidence of pneumoperitoneum on the upright view.    Bowel gas pattern is  nonobstructive without gross mass lesion.  There is no radiopaque calculus.    Radiopaque cement from previous kyphoplasty overlies the T12 and L4 vertebral bodies.  Osteopenia.  Lumbar spondylosis                                       X-Ray Chest PA And Lateral (Final result)  Result time 09/13/23 20:46:46      Final result by Lenard Good MD (09/13/23 20:46:46)                   Impression:      Evidence of CHF with bibasilar pulmonary edema and mild bilateral pleural effusion      Electronically signed by: Lenard Good  Date:    09/13/2023  Time:    20:46               Narrative:    EXAMINATION:  XR CHEST PA AND LATERAL    CLINICAL HISTORY:  epigastric pain;.    COMPARISON:  October 30, 2018    TECHNIQUE:  PA and lateral views of the chest    FINDINGS:  There is cardiomegaly and mild pulmonary vascular prominence.  Mediastinal contours are stable.  Prior median sternotomy.    There is some hazy bibasilar pulmonary edema.  There is mild bilateral pleural effusion.    Left subclavian dual lead pacemaker/defibrillator device has been placed since the previous study and appears intact and generally well positioned.    Osseous structures are unchanged                                       Medications   lactated ringers bolus 500 mL (0 mLs Intravenous Stopped 9/13/23 2136)   nitroGLYCERIN 2% TD oint ointment 0.5 inch (0.5 inches Topical (Top) Given 9/13/23 2036)   famotidine (PF) injection 20 mg (20 mg Intravenous Given 9/13/23 2102)   ondansetron injection 4 mg (4 mg Intravenous Given 9/13/23 2102)   furosemide injection 40 mg (40 mg Intravenous Given 9/13/23 2128)   furosemide injection 20 mg (20 mg Intravenous Given 9/13/23 2233)     Medical Decision Making  78 y-old AAF received to ED with complaint of N/V/D that has been ongoing since Saturday 09/09/2023. Patient states that her symptoms were initially just diarrhea with TNTC on Saturday, fewer on Sunday and 1-2 per day since. States that her vomiting  "started today 09/13/2023. States "I just couldn't  keep anything down." States that she has been able to keep water down over the past few hours. C/o epigastric pain that is described as a "burning" type pain. No there complaints voiced.        Amount and/or Complexity of Data Reviewed  Labs: ordered.     Details: Troponin 217.3, BNP 33,780, BUN/Creat 32/2.32. Repeat troponin 200.5  Radiology: ordered.     Details: CXR with evidence of CHF with bibasilar pulmonary edema and mild bilateral pleural effusion.     Risk  Prescription drug management.  Risk Details: Patient with elevated troponin/BNP and evidence of CHF. Has extensive cardiac history and is followed at [facility]. Will need to be transferred to a higher level of care. This was discussed at length with patient and her family member who is at bedside. All hospitals in [facility] are on bypass r/t bed availability. I discussed this at length with patient and her daughter. I recommended that we try a bed farther out. This was discussed at length. Patient and daughter state that they would rather go home. I again cautioned them r/t adverse results of signing out AMA. Patient and Daughter again state that they would rather go home.                                Clinical Impression:   Final diagnoses:  [R10.9] Abdominal pain  [R10.13] Epigastric pain  [R07.9] Chest pain  [I50.9] Congestive heart failure, unspecified HF chronicity, unspecified heart failure type (Primary)  [R77.8] Elevated troponin        ED Disposition Condition    AMA Stable                Jered Lilly, KRISSY-C  09/13/23 5765    "

## 2024-02-29 ENCOUNTER — HOSPITAL ENCOUNTER (OUTPATIENT)
Dept: RADIOLOGY | Facility: HOSPITAL | Age: 79
Discharge: HOME | End: 2024-02-29
Attending: NURSE PRACTITIONER
Payer: MEDICARE

## 2024-02-29 VITALS — BODY MASS INDEX: 21.71 KG/M2 | WEIGHT: 118 LBS | HEIGHT: 62 IN

## 2024-02-29 DIAGNOSIS — Z12.31 BREAST CANCER SCREENING BY MAMMOGRAM: ICD-10-CM

## 2024-02-29 PROCEDURE — 77067 SCR MAMMO BI INCL CAD: CPT | Mod: TC

## 2024-05-02 ENCOUNTER — HOSPITAL ENCOUNTER (INPATIENT)
Facility: HOSPITAL | Age: 79
LOS: 15 days | Discharge: HOME HEALTH | DRG: 641 | End: 2024-05-17
Attending: FAMILY MEDICINE | Admitting: FAMILY MEDICINE
Payer: MEDICARE

## 2024-05-02 DIAGNOSIS — E87.5 HYPERKALEMIA: ICD-10-CM

## 2024-05-02 DIAGNOSIS — R53.1 WEAKNESS: ICD-10-CM

## 2024-05-02 DIAGNOSIS — E10.649 TYPE 1 DIABETES MELLITUS WITH HYPOGLYCEMIA AND WITHOUT COMA: Primary | ICD-10-CM

## 2024-05-02 PROBLEM — E10.9 DM (DIABETES MELLITUS), TYPE 1: Status: RESOLVED | Noted: 2024-05-02 | Resolved: 2024-05-02

## 2024-05-02 PROBLEM — E07.9 THYROID DISEASE: Status: ACTIVE | Noted: 2024-05-02

## 2024-05-02 PROBLEM — I10 PRIMARY HYPERTENSION: Status: ACTIVE | Noted: 2024-05-02

## 2024-05-02 PROBLEM — K21.9 GASTROESOPHAGEAL REFLUX DISEASE WITHOUT ESOPHAGITIS: Status: ACTIVE | Noted: 2024-05-02

## 2024-05-02 PROBLEM — E78.5 HYPERLIPIDEMIA: Status: ACTIVE | Noted: 2024-05-02

## 2024-05-02 PROBLEM — E16.2 HYPOGLYCEMIA: Status: RESOLVED | Noted: 2023-08-19 | Resolved: 2024-05-02

## 2024-05-02 PROBLEM — F33.40 RECURRENT MAJOR DEPRESSIVE DISORDER, IN REMISSION: Status: ACTIVE | Noted: 2024-05-02

## 2024-05-02 PROBLEM — E10.9 DM (DIABETES MELLITUS), TYPE 1: Status: ACTIVE | Noted: 2024-05-02

## 2024-05-02 LAB
ANION GAP SERPL CALCULATED.3IONS-SCNC: 19 MMOL/L (ref 7–16)
BASOPHILS # BLD AUTO: 0.02 K/UL (ref 0–0.2)
BASOPHILS NFR BLD AUTO: 0.5 % (ref 0–1)
BUN SERPL-MCNC: 50 MG/DL (ref 7–18)
BUN/CREAT SERPL: 19 (ref 6–20)
CALCIUM SERPL-MCNC: 7.3 MG/DL (ref 8.5–10.1)
CHLORIDE SERPL-SCNC: 105 MMOL/L (ref 98–107)
CO2 SERPL-SCNC: 20 MMOL/L (ref 21–32)
CREAT SERPL-MCNC: 2.7 MG/DL (ref 0.55–1.02)
DIFFERENTIAL METHOD BLD: ABNORMAL
EGFR (NO RACE VARIABLE) (RUSH/TITUS): 18 ML/MIN/1.73M2
EOSINOPHIL # BLD AUTO: 0.13 K/UL (ref 0–0.5)
EOSINOPHIL NFR BLD AUTO: 3.4 % (ref 1–4)
ERYTHROCYTE [DISTWIDTH] IN BLOOD BY AUTOMATED COUNT: 15 % (ref 11.5–14.5)
GLUCOSE SERPL-MCNC: 353 MG/DL (ref 74–106)
GLUCOSE SERPL-MCNC: 394 MG/DL (ref 70–105)
HCT VFR BLD AUTO: 33 % (ref 38–47)
HGB BLD-MCNC: 9.8 G/DL (ref 12–16)
LYMPHOCYTES # BLD AUTO: 1 K/UL (ref 1–4.8)
LYMPHOCYTES NFR BLD AUTO: 25.8 % (ref 27–41)
MCH RBC QN AUTO: 29.3 PG (ref 27–31)
MCHC RBC AUTO-ENTMCNC: 29.7 G/DL (ref 32–36)
MCV RBC AUTO: 98.8 FL (ref 80–96)
MONOCYTES # BLD AUTO: 0.46 K/UL (ref 0–0.8)
MONOCYTES NFR BLD AUTO: 11.9 % (ref 2–6)
MPC BLD CALC-MCNC: 11.9 FL (ref 9.4–12.4)
NEUTROPHILS # BLD AUTO: 2.26 K/UL (ref 1.8–7.7)
NEUTROPHILS NFR BLD AUTO: 58.4 % (ref 53–65)
PLATELET # BLD AUTO: 204 K/UL (ref 150–400)
POTASSIUM SERPL-SCNC: 4.5 MMOL/L (ref 3.5–5.1)
RBC # BLD AUTO: 3.34 M/UL (ref 4.2–5.4)
SODIUM SERPL-SCNC: 139 MMOL/L (ref 136–145)
WBC # BLD AUTO: 3.87 K/UL (ref 4.5–11)

## 2024-05-02 PROCEDURE — 85025 COMPLETE CBC W/AUTO DIFF WBC: CPT

## 2024-05-02 PROCEDURE — 82962 GLUCOSE BLOOD TEST: CPT

## 2024-05-02 PROCEDURE — 25000003 PHARM REV CODE 250

## 2024-05-02 PROCEDURE — 36415 COLL VENOUS BLD VENIPUNCTURE: CPT

## 2024-05-02 PROCEDURE — 80048 BASIC METABOLIC PNL TOTAL CA: CPT

## 2024-05-02 PROCEDURE — 11000004 HC SNF PRIVATE

## 2024-05-02 PROCEDURE — 84443 ASSAY THYROID STIM HORMONE: CPT

## 2024-05-02 PROCEDURE — 63600175 PHARM REV CODE 636 W HCPCS

## 2024-05-02 PROCEDURE — 83036 HEMOGLOBIN GLYCOSYLATED A1C: CPT

## 2024-05-02 RX ORDER — FUROSEMIDE 20 MG/1
20 TABLET ORAL DAILY PRN
Status: DISCONTINUED | OUTPATIENT
Start: 2024-05-02 | End: 2024-05-17 | Stop reason: HOSPADM

## 2024-05-02 RX ORDER — INSULIN ASPART 100 [IU]/ML
0-5 INJECTION, SOLUTION INTRAVENOUS; SUBCUTANEOUS
Status: DISCONTINUED | OUTPATIENT
Start: 2024-05-02 | End: 2024-05-03

## 2024-05-02 RX ORDER — SERTRALINE HYDROCHLORIDE 25 MG/1
25 TABLET, FILM COATED ORAL DAILY
Status: DISCONTINUED | OUTPATIENT
Start: 2024-05-03 | End: 2024-05-17 | Stop reason: HOSPADM

## 2024-05-02 RX ORDER — LATANOPROST 50 UG/ML
1 SOLUTION/ DROPS OPHTHALMIC NIGHTLY
Status: DISCONTINUED | OUTPATIENT
Start: 2024-05-02 | End: 2024-05-17 | Stop reason: HOSPADM

## 2024-05-02 RX ORDER — ATORVASTATIN CALCIUM 40 MG/1
80 TABLET, FILM COATED ORAL NIGHTLY
Status: DISCONTINUED | OUTPATIENT
Start: 2024-05-02 | End: 2024-05-17 | Stop reason: HOSPADM

## 2024-05-02 RX ORDER — TALC
6 POWDER (GRAM) TOPICAL NIGHTLY PRN
Status: DISCONTINUED | OUTPATIENT
Start: 2024-05-02 | End: 2024-05-17 | Stop reason: HOSPADM

## 2024-05-02 RX ORDER — CARVEDILOL 6.25 MG/1
12.5 TABLET ORAL 2 TIMES DAILY
Status: DISCONTINUED | OUTPATIENT
Start: 2024-05-02 | End: 2024-05-17 | Stop reason: HOSPADM

## 2024-05-02 RX ORDER — LEVOTHYROXINE SODIUM 25 UG/1
25 TABLET ORAL
COMMUNITY

## 2024-05-02 RX ORDER — IBUPROFEN 200 MG
16 TABLET ORAL
Status: DISCONTINUED | OUTPATIENT
Start: 2024-05-02 | End: 2024-05-03

## 2024-05-02 RX ORDER — ACETAMINOPHEN 325 MG/1
650 TABLET ORAL EVERY 6 HOURS PRN
Status: DISCONTINUED | OUTPATIENT
Start: 2024-05-02 | End: 2024-05-17 | Stop reason: HOSPADM

## 2024-05-02 RX ORDER — PANTOPRAZOLE SODIUM 40 MG/1
40 TABLET, DELAYED RELEASE ORAL DAILY
Status: DISCONTINUED | OUTPATIENT
Start: 2024-05-03 | End: 2024-05-17 | Stop reason: HOSPADM

## 2024-05-02 RX ORDER — LEVOTHYROXINE SODIUM 25 UG/1
25 TABLET ORAL
Status: DISCONTINUED | OUTPATIENT
Start: 2024-05-03 | End: 2024-05-17 | Stop reason: HOSPADM

## 2024-05-02 RX ORDER — AMOXICILLIN 250 MG
1 CAPSULE ORAL 2 TIMES DAILY PRN
Status: DISCONTINUED | OUTPATIENT
Start: 2024-05-02 | End: 2024-05-17 | Stop reason: HOSPADM

## 2024-05-02 RX ORDER — GLUCAGON 1 MG
1 KIT INJECTION
Status: DISCONTINUED | OUTPATIENT
Start: 2024-05-02 | End: 2024-05-03

## 2024-05-02 RX ORDER — CALCIUM CARBONATE 200(500)MG
500 TABLET,CHEWABLE ORAL 2 TIMES DAILY PRN
Status: DISCONTINUED | OUTPATIENT
Start: 2024-05-02 | End: 2024-05-17 | Stop reason: HOSPADM

## 2024-05-02 RX ORDER — ASPIRIN 81 MG/1
81 TABLET ORAL DAILY
Status: DISCONTINUED | OUTPATIENT
Start: 2024-05-03 | End: 2024-05-17 | Stop reason: HOSPADM

## 2024-05-02 RX ORDER — PANTOPRAZOLE SODIUM 40 MG/1
40 TABLET, DELAYED RELEASE ORAL DAILY
COMMUNITY

## 2024-05-02 RX ORDER — SPIRONOLACTONE 25 MG/1
25 TABLET ORAL DAILY
Status: DISCONTINUED | OUTPATIENT
Start: 2024-05-03 | End: 2024-05-09

## 2024-05-02 RX ORDER — IBUPROFEN 200 MG
24 TABLET ORAL
Status: DISCONTINUED | OUTPATIENT
Start: 2024-05-02 | End: 2024-05-03

## 2024-05-02 RX ORDER — ISOSORBIDE MONONITRATE 30 MG/1
30 TABLET, EXTENDED RELEASE ORAL DAILY
Status: DISCONTINUED | OUTPATIENT
Start: 2024-05-03 | End: 2024-05-17 | Stop reason: HOSPADM

## 2024-05-02 RX ADMIN — ATORVASTATIN CALCIUM 80 MG: 40 TABLET, FILM COATED ORAL at 09:05

## 2024-05-02 RX ADMIN — LATANOPROST 1 DROP: 50 SOLUTION OPHTHALMIC at 09:05

## 2024-05-02 RX ADMIN — INSULIN ASPART 3 UNITS: 100 INJECTION, SOLUTION INTRAVENOUS; SUBCUTANEOUS at 09:05

## 2024-05-02 RX ADMIN — CARVEDILOL 12.5 MG: 6.25 TABLET, FILM COATED ORAL at 09:05

## 2024-05-03 LAB
BACTERIA #/AREA URNS HPF: ABNORMAL /HPF
BILIRUB UR QL STRIP: NEGATIVE
CLARITY UR: ABNORMAL
COLOR UR: YELLOW
EST. AVERAGE GLUCOSE BLD GHB EST-MCNC: 177 MG/DL
GLUCOSE SERPL-MCNC: 106 MG/DL (ref 70–105)
GLUCOSE SERPL-MCNC: 182 MG/DL (ref 70–105)
GLUCOSE SERPL-MCNC: 195 MG/DL (ref 70–105)
GLUCOSE UR STRIP-MCNC: 100 MG/DL
HBA1C MFR BLD HPLC: 7.8 % (ref 4.5–6.6)
KETONES UR STRIP-SCNC: NEGATIVE MG/DL
LEUKOCYTE ESTERASE UR QL STRIP: ABNORMAL
NITRITE UR QL STRIP: NEGATIVE
PH UR STRIP: 5.5 PH UNITS
PROT UR QL STRIP: 30
RBC # UR STRIP: ABNORMAL /UL
RBC #/AREA URNS HPF: ABNORMAL /HPF
SP GR UR STRIP: 1.01
SQUAMOUS #/AREA URNS LPF: ABNORMAL /LPF
TSH SERPL DL<=0.005 MIU/L-ACNC: 4.39 UIU/ML (ref 0.36–3.74)
UROBILINOGEN UR STRIP-ACNC: 0.2 MG/DL
WBC #/AREA URNS HPF: ABNORMAL /HPF

## 2024-05-03 PROCEDURE — 87086 URINE CULTURE/COLONY COUNT: CPT

## 2024-05-03 PROCEDURE — 92610 EVALUATE SWALLOWING FUNCTION: CPT

## 2024-05-03 PROCEDURE — 92523 SPEECH SOUND LANG COMPREHEN: CPT

## 2024-05-03 PROCEDURE — 97166 OT EVAL MOD COMPLEX 45 MIN: CPT

## 2024-05-03 PROCEDURE — 97162 PT EVAL MOD COMPLEX 30 MIN: CPT

## 2024-05-03 PROCEDURE — 11000004 HC SNF PRIVATE

## 2024-05-03 PROCEDURE — 25000003 PHARM REV CODE 250

## 2024-05-03 PROCEDURE — 81001 URINALYSIS AUTO W/SCOPE: CPT

## 2024-05-03 PROCEDURE — 63600175 PHARM REV CODE 636 W HCPCS

## 2024-05-03 PROCEDURE — 82962 GLUCOSE BLOOD TEST: CPT

## 2024-05-03 RX ORDER — SULFAMETHOXAZOLE AND TRIMETHOPRIM 800; 160 MG/1; MG/1
1 TABLET ORAL DAILY
Status: DISCONTINUED | OUTPATIENT
Start: 2024-05-04 | End: 2024-05-07

## 2024-05-03 RX ORDER — SULFAMETHOXAZOLE AND TRIMETHOPRIM 800; 160 MG/1; MG/1
1 TABLET ORAL 2 TIMES DAILY
Status: DISCONTINUED | OUTPATIENT
Start: 2024-05-03 | End: 2024-05-03

## 2024-05-03 RX ADMIN — ATORVASTATIN CALCIUM 80 MG: 40 TABLET, FILM COATED ORAL at 09:05

## 2024-05-03 RX ADMIN — INSULIN DETEMIR 10 UNITS: 100 INJECTION, SOLUTION SUBCUTANEOUS at 09:05

## 2024-05-03 RX ADMIN — ISOSORBIDE MONONITRATE 30 MG: 30 TABLET, EXTENDED RELEASE ORAL at 09:05

## 2024-05-03 RX ADMIN — CARVEDILOL 12.5 MG: 6.25 TABLET, FILM COATED ORAL at 09:05

## 2024-05-03 RX ADMIN — SULFAMETHOXAZOLE AND TRIMETHOPRIM 1 TABLET: 800; 160 TABLET ORAL at 09:05

## 2024-05-03 RX ADMIN — LEVOTHYROXINE SODIUM 25 MCG: 25 TABLET ORAL at 06:05

## 2024-05-03 RX ADMIN — ASPIRIN 81 MG: 81 TABLET, COATED ORAL at 09:05

## 2024-05-03 RX ADMIN — LATANOPROST 1 DROP: 50 SOLUTION OPHTHALMIC at 09:05

## 2024-05-03 RX ADMIN — SERTRALINE HYDROCHLORIDE 25 MG: 25 TABLET ORAL at 09:05

## 2024-05-03 RX ADMIN — PANTOPRAZOLE SODIUM 40 MG: 40 TABLET, DELAYED RELEASE ORAL at 09:05

## 2024-05-03 RX ADMIN — SPIRONOLACTONE 25 MG: 25 TABLET, FILM COATED ORAL at 09:05

## 2024-05-03 NOTE — PLAN OF CARE
Problem: SLP  Goal: SLP Goal  Description: Short Term Goals:  Pt will demonstrate orientation to person, place, time with 60% accuracy.  Pt will recall 3 details with 60% accuracy.   Pt will demonstrate ID safe/unsafe situations with 60% accuracy.   Pt will demonstrate problem solving and reasoning skills with 60% accuracy.     Long Term Goals:  Pt will demonstrate orientation to person, place, time with 80% accuracy.  Pt will recall 3 details with 80% accuracy.   Pt will demonstrate ID safe/unsafe situations with 80% accuracy.   Pt will demonstrate problem solving and reasoning skills with 80% accuracy.   Outcome: Progressing

## 2024-05-03 NOTE — CONSULTS
[facility]  Adult Nutrition  Consult Note         Reason for Assessment  Reason For Assessment: consult swb admit  Nutrition Risk Screen: no indicators present    Assessment and Plan  Consult received and appreciated. Patient admitted on 5/2 with a dx of weakness, type 1 DM, dementia, heart failure and thyroid disease. Patient is ordered an 1800 calorie consistent carbohydrate diet. PO intake is good per flowsheets with % intake documented. Noted patient drinking ONS at times.     Patient is 53.4 kg with a BMI of 21.355 which is considered underweight per geriatric standards. Will provide additional calories with Boost Glucose Control BID. Encourage po intakes. RD will follow up with visit as appropriate on return to facility.       Learning Needs/Social Determinants of Health  Learning Assessment       05/02/2024 1728 [facility] (5/2/2024 - Present)   Created by Hortensia Douglass RN - RN (Nurse) Status: Complete                 PRIMARY LEARNER     Primary Learner Name:  latanya murphy CR - 05/02/2024 1728    Relationship:  Patient CR - 05/02/2024 1728    Does the primary learner have any barriers to learning?:  No Barriers CR - 05/02/2024 1728    What is the preferred language of the primary learner?:  English CR - 05/02/2024 1728    Is an  required?:  No CR - 05/02/2024 1728    How does the primary learner prefer to learn new concepts?:  Listening CR - 05/02/2024 1728    How often do you need to have someone help you read instructions, pamphlets, or written material from your doctor or pharmacy?:  Rarely CR - 05/02/2024 1728        CO-LEARNER #1     No question answered        CO-LEARNER #2     No question answered        SPECIAL TOPICS     No question answered        ANSWERED BY:     No question answered        Comments         Edit History       Hortensia Douglass, RN - RN (Nurse)   05/02/2024 1728                           Social Determinants of Health     Tobacco Use: Low Risk  (9/13/2023)    Patient History     Smoking Tobacco Use: Never     Smokeless Tobacco Use: Never     Passive Exposure: Not on file   Alcohol Use: Not At Risk (5/3/2024)    AUDIT-C     Frequency of Alcohol Consumption: Never     Average Number of Drinks: Patient does not drink     Frequency of Binge Drinking: Never   Financial Resource Strain: Low Risk  (5/3/2024)    Overall Financial Resource Strain (CARDIA)     Difficulty of Paying Living Expenses: Not hard at all   Food Insecurity: No Food Insecurity (5/3/2024)    Hunger Vital Sign     Worried About Running Out of Food in the Last Year: Never true     Ran Out of Food in the Last Year: Never true   Transportation Needs: No Transportation Needs (5/3/2024)    PRAPARE - Transportation     Lack of Transportation (Medical): No     Lack of Transportation (Non-Medical): No   Physical Activity: Inactive (5/3/2024)    Exercise Vital Sign     Days of Exercise per Week: 0 days     Minutes of Exercise per Session: 0 min   Stress: No Stress Concern Present (5/3/2024)    [employer] [place] [employer] - Occupational Stress Questionnaire     Feeling of Stress : Not at all   Housing Stability: Low Risk  (5/3/2024)    Housing Stability Vital Sign     Unable to Pay for Housing in the Last Year: No     Homeless in the Last Year: No   Depression: Not on file   Utilities: Not on file   Health Literacy: Not on file   Social Isolation: Not on file            Malnutrition  Is Patient Malnourished: No    Nutrition Diagnosis  Altered nutrition related laboratory values related to Diabetes Mellitus as evidenced by blood glucose levels elevated  Comments: on CCD    Recent Labs   Lab 05/02/24  2224 05/03/24  0553 05/03/24  1100   *  --   --    POCGLU  --    < > 195*    < > = values in this interval not displayed.     Comments on Glucose: not WNL; hx type 1 DM with hypoglycemia; noted insulin ordered once  daily    Nutrition Prescription / Recommendations  Recommendation/Intervention: Continue current diet as ordered. Will order Boost Glucose Control (sub Glucerna) BID  Goals: continued good po intake of at least 75% during admission; weight maintenance during admission  Nutrition Goal Status: new  Current Diet Order: 1800 calorie consistent carbohydrate diet  Oral Nutrition Supplement: noted drinking Glucerna at times per flowsheets regular diet rec per SLP  Chewing or Swallowing Difficulty?: No Chewing or swallowing difficulty  Recommended Diet: Consistent Carbohydrate 1800 (60g Carbs)  Recommended Oral Supplement: Boost Glucose Control [190kcals, 16g protein, 16g Carbs] 2 times a day  Is Nutrition Support Recommended: [facility] Nutrition Support: No  Is Nutrition Education Recommended: No    Monitor and Evaluation  % current Intake: P.O. intake of 75 - 100 %  % intake to meet estimated needs: 75 - 100 %  Food and Nutrient Intake: energy intake, food and beverage intake  Food and Nutrient Adminstration: diet order  Anthropometric Measurements: height/length, weight change, weight, body mass index  Biochemical Data, Medical Tests and Procedures: electrolyte and renal panel, gastrointestinal profile, glucose/endocrine profile, inflammatory profile  Energy Calories Required: meeting needs  Protein Required: meeting needs  Fluid Required: meeting needs  Tolerance: tolerating    Current Medical Diagnosis and Past Medical History     Past Medical History:   Diagnosis Date    Diabetes mellitus, type 2     Heart disease     Hypertension     Parkinson disease        Nutrition/Diet History  Spiritual, Cultural Beliefs, Buddhist Practices, Values that Affect Care: no  Food Allergies: NKFA  Factors Affecting Nutritional Intake: None identified at this time    Lab/Procedures/Meds  Recent Labs   Lab 05/02/24  2224      K 4.5   BUN 50*   CREATININE 2.70*   CALCIUM 7.3*      Bun/Cr elevated  Last A1c: elevated  Lab  "Results   Component Value Date    HGBA1C 7.8 (H) 05/02/2024     Lab Results   Component Value Date    RBC 3.34 (L) 05/02/2024    HGB 9.8 (L) 05/02/2024    HCT 33.0 (L) 05/02/2024    MCV 98.8 (H) 05/02/2024    MCH 29.3 05/02/2024    MCHC 29.7 (L) 05/02/2024     Pertinent Labs Reviewed: reviewed  Pertinent Labs Comments: Glucose 186-384  Pertinent Medications Reviewed: reviewed  Scheduled Meds:  Current Facility-Administered Medications   Medication Dose Route Frequency    aspirin  81 mg Oral Daily    atorvastatin  80 mg Oral QHS    carvediloL  12.5 mg Oral BID    insulin detemir U-100  10 Units Subcutaneous Daily    isosorbide mononitrate  30 mg Oral Daily    latanoprost  1 drop Left Eye QHS    levothyroxine  25 mcg Oral Before breakfast    pantoprazole  40 mg Oral Daily    sertraline  25 mg Oral Daily    spironolactone  25 mg Oral Daily    [START ON 5/4/2024] sulfamethoxazole-trimethoprim 800-160mg  1 tablet Oral Daily     Continuous Infusions:  Current Facility-Administered Medications   Medication Dose Route Frequency Last Rate Last Admin     PRN Meds:.  Current Facility-Administered Medications:     acetaminophen, 650 mg, Oral, Q6H PRN    calcium carbonate, 500 mg, Oral, BID PRN    dextrose 10%, 12.5 g, Intravenous, PRN    dextrose 10%, 25 g, Intravenous, PRN    furosemide, 20 mg, Oral, Daily PRN    melatonin, 6 mg, Oral, Nightly PRN    senna-docusate 8.6-50 mg, 1 tablet, Oral, BID PRN    Anthropometrics  Temp: 98.1 °F (36.7 °C)  Height: 5' 2" (157.5 cm)  Height (inches): 62 in  Weight Method: Bed Scale  Weight: 53.4 kg (117 lb 12.8 oz)  Weight (lb): 117.8 lb  Ideal Body Weight (IBW), Female: 110 lb  % Ideal Body Weight, Female (lb): 107.09 %  BMI (Calculated): 21.5       Estimated/Assessed Needs      Temp: 98.1 °F (36.7 °C)Oral  Weight Used For Calorie Calculations: 53.4 kg (117 lb 11.6 oz)   Energy Need Method: Kcal/kg Energy Calorie Requirements (kcal): 8886-7395  Weight Used For Protein Calculations: 53.4 kg " (117 lb 11.6 oz)  Protein Requirements: 43-54       RDA Method (mL): 1335       Nutrition by Nursing  Diet/Nutrition Received: consistent carb/diabetic diet  Intake (%): 100%  Diet/Feeding Assistance: none     Last Bowel Movement: 05/03/24                Nutrition Follow-Up  RD Follow-up?: Yes      Nutrition Discharge Planning: Home with home health; patient would benefit from a liberalized diet on d/c with ONS             Available via Secure Chat

## 2024-05-03 NOTE — PT/OT/SLP EVAL
"Speech Language Pathology Evaluation  Cognitive/Bedside Swallow    Patient Name:  Jeanie Lucas   MRN:  [id number]  Admitting Diagnosis: Weakness    Recommendations:                  General Recommendations:  Dysphagia therapy and Cognitive-linguistic therapy  Diet recommendations:  Regular, Thin   Aspiration Precautions: Standard aspiration precautions   General Precautions: Standard, fall  Communication strategies:  none    Assessment:     Jeanie Lucas is a 78 y.o. female with an SLP diagnosis of Cognitive-Linguistic Impairment.  She presents with hospital admission and following SWB admission secondary to frequent falls, altered mental status due to metabolic encephalopathy.    Pt presents with change in cognitive status, increased falls, decreased safety awareness, decline in independence with ADLs    History:     Past Medical History:   Diagnosis Date    Diabetes mellitus, type 2     Heart disease     Hypertension     Parkinson disease        Past Surgical History:   Procedure Laterality Date    CARDIAC DEFIBRILLATOR PLACEMENT  2018    COMBINED RIGHT AND TRANSSEPTAL (EXISTING OPENING) LEFT HEART CATHETERIZATION      defibrilator      HYSTERECTOMY      OOPHORECTOMY      REMOVAL OF HARDWARE FROM LOWER EXTREMITY Right 06/10/2021    Procedure: REMOVAL, HARDWARE, LOWER EXTREMITY, PATELLA;  Surgeon: Artis Monaco MD;  Location: [facility];  Service: Orthopedics;  Laterality: Right;       Social History: Patient lives in her own home with a daughter whom assists with grocery pickup/meals/med management.    Prior Intubation HX:  na    Modified Barium Swallow: na    Chest X-Rays: see chart    Prior diet: regular, thin liquids    Occupation/hobbies/homemaking: pt worked at [employer] prior to retiring.    Subjective     Pt alert, agree to ST eval  Patient goals: "I want to go back home, my daughter thinks I need to be here, not me."     Pain/Comfort:       Respiratory Status: Room air    Objective:     Cognitive " Status:    Attention Sustained attention deficit mild  Orientation Person and Situation  Memory Immediate Recall mild to moderate and Delayedmoderate  Problem Solving Conclusions mild to moderate, Categories mild to moderate, and Sequencing moderate  Safety awareness moderate  Reasoning Inferences moderate     Receptive Language:   Comprehension:      WFL    Pragmatics:    WFL    Expressive Language:  Verbal:    Automatic Speech  WFL  Repetition Phrases WFL  Naming Confrontation WFL        Motor Speech:  WFL    Voice:   WFL    Visual-Spatial:  WFL    Reading:   WFL       Oral Musculature Evaluation  Oral Musculature: WNL  Dentition: present and adequate  Secretion Management: adequate  Mucosal Quality: adequate  Mandibular Strength and Mobility: WNL  Oral Labial Strength and Mobility: WNL  Lingual Strength and Mobility: WNL  Velar Elevation: WNL  Buccal Strength and Mobility: WNL  Volitional Cough: WFL  Volitional Swallow: WFL  Voice Prior to PO Intake: WFL    Bedside Swallow Eval:   Consistencies Assessed:  Thin liquids x4  Soft solids x5  Solids x3      Oral Phase:   WFL    Pharyngeal Phase:   no overt clinical signs/symptoms of aspiration    Compensatory Strategies  None    Treatment: cognitive treatment    Goals:   Multidisciplinary Problems       SLP Goals          Problem: SLP    Goal Priority Disciplines Outcome   SLP Goal     SLP Progressing   Description: Short Term Goals:  Pt will demonstrate orientation to person, place, time with 60% accuracy.  Pt will recall 3 details with 60% accuracy.   Pt will demonstrate ID safe/unsafe situations with 60% accuracy.   Pt will demonstrate problem solving and reasoning skills with 60% accuracy.     Long Term Goals:  Pt will demonstrate orientation to person, place, time with 80% accuracy.  Pt will recall 3 details with 80% accuracy.   Pt will demonstrate ID safe/unsafe situations with 80% accuracy.   Pt will demonstrate problem solving and reasoning skills with 80%  accuracy.                        Plan:     Patient to be seen:  5 x/week   Plan of Care expires:  05/24/24  Plan of Care reviewed with:  patient   SLP Follow-Up:  Yes       Discharge recommendations:  Therapy Intensity Recommendations at Discharge: Moderate Intensity Therapy   Barriers to Discharge:  Level of Skilled Assistance Needed currently since change in status    Time Tracking:     SLP Treatment Date:      Speech Start Time:  0750  Speech Stop Time:  0818     Speech Total Time (min):  28 min    Billable Minutes: Eval Speech/Language 18 Eval Swallow Dysfunction 10     05/03/2024

## 2024-05-03 NOTE — PT/OT/SLP EVAL
"Occupational Therapy   Evaluation    Name: Jeanie Lucas  MRN: [id number]  Admitting Diagnosis: Weakness  Recent Surgery: * No surgery found *      Recommendations:     Discharge Recommendations: Low Intensity Therapy  Discharge Equipment Recommendations:  none  Barriers to discharge:  Decreased caregiver support    Assessment:     Jeanie Lucas is a 78 y.o. female with a medical diagnosis of Weakness.  She presents with the following performance deficits affecting function: weakness, impaired endurance, impaired self care skills, impaired functional mobility.      Rehab Prognosis: Good; patient would benefit from acute skilled OT services to address these deficits and reach maximum level of function.       Plan:     Patient to be seen 5 x/week to address the above listed problems via self-care/home management, therapeutic activities, therapeutic exercises, neuromuscular re-education  Plan of Care Expires: 05/17/24  Plan of Care Reviewed with: patient    Subjective     Chief Complaint: "I just need some rest."  Patient/Family Comments/goals: pt agreeable to OT eval    Occupational Profile:  Living Environment: pt lives home alone and daughter checks on her morning/evening  Previous level of function: Ind with ADLs, cooking and used rollator  Equipment Used at Home: rollator  Assistance upon Discharge: family and poss. Home health    Pain/Comfort:  Pain Rating 1: 0/10  Pain Rating Post-Intervention 1: 0/10    Patients cultural, spiritual, Sabianist conflicts given the current situation: no    Objective:     Communicated with: RN prior to session.  Patient found supine  upon OT entry to room.    General Precautions: Standard, fall  Orthopedic Precautions: N/A  Braces: N/A  Respiratory Status: Room air    Occupational Performance:    Bed Mobility:    Patient completed Rolling/Turning to Left with  stand by assistance  Patient completed Rolling/Turning to Right with stand by assistance  Patient completed Supine to Sit with " stand by assistance  Patient completed Sit to Supine with stand by assistance    Functional Mobility/Transfers:  Patient completed Sit <> Stand Transfer with contact guard assistance and minimum assistance  with  rollator   Functional Mobility: see PT eval    Activities of Daily Living:  CGA to Min A with ADLs     Physical Exam:  Upper Extremity Range of Motion:     -       Right Upper Extremity: WFL  -       Left Upper Extremity: WFL  Upper Extremity Strength:    -       Right Upper Extremity: 4-/5  -       Left Upper Extremity: 4-/5   Strength:    -       Right Upper Extremity: WFL  -       Left Upper Extremity: WFL    AMPAC 6 Click ADL:  AMPAC Total Score: 20    Treatment & Education:  Pt educated on OT role/POC.   Importance of OOB activity with staff assistance.  Importance of sitting up in the chair throughout the day as tolerated, especially for meals   Safety during functional t/f and mobility  Importance of assisting with self-care activities          Patient left sitting edge of bed with call button in reach and daughter present    GOALS:   Multidisciplinary Problems       Occupational Therapy Goals          Problem: Occupational Therapy    Goal Priority Disciplines Outcome Interventions   Occupational Therapy Goal     OT, PT/OT Progressing    Description: STG:  Pt will perform grooming with I  Pt will bathe self with SVN  Pt will perform UB dressing with SVN  Pt will perform LB dressing with SVN  Pt will transfer to toilet with SBA  Pt will perform standing task x 3 min with SBA    LTG:  Pt will bathe self with I  Pt will perform UB dressing with I  Pt will perform LB dressing with I  Pt will transfer to toilet with I  Pt will perform standing task x 5 min with SBA                        History:     Past Medical History:   Diagnosis Date    Diabetes mellitus, type 2     Heart disease     Hypertension     Parkinson disease          Past Surgical History:   Procedure Laterality Date    CARDIAC  DEFIBRILLATOR PLACEMENT  2018    COMBINED RIGHT AND TRANSSEPTAL (EXISTING OPENING) LEFT HEART CATHETERIZATION      defibrilator      HYSTERECTOMY      OOPHORECTOMY      REMOVAL OF HARDWARE FROM LOWER EXTREMITY Right 06/10/2021    Procedure: REMOVAL, HARDWARE, LOWER EXTREMITY, PATELLA;  Surgeon: Artis Monaco MD;  Location: [facility];  Service: Orthopedics;  Laterality: Right;       Time Tracking:     OT Date of Treatment: 05/03/24  OT Start Time: 1105  OT Stop Time: 1115  OT Total Time (min): 10 min    Billable Minutes:Evaluation 10    5/3/2024

## 2024-05-03 NOTE — PT/OT/SLP EVAL
Physical Therapy Evaluation    Patient Name:  Jeanie Lucas   MRN:  [id number]    Recommendations:     Discharge Recommendations: Low Intensity Therapy   Discharge Equipment Recommendations: none   Barriers to discharge: None    Assessment:     Jeanie Lucas is a 78 y.o. female admitted with a medical diagnosis of Weakness.  She presents with the following impairments/functional limitations: weakness, gait instability, impaired self care skills, impaired functional mobility, visual deficits, pain, impaired balance, impaired endurance, decreased lower extremity function.    Rehab Prognosis: Good; patient would benefit from acute skilled PT services to address these deficits and reach maximum level of function.    Recent Surgery: * No surgery found *      Plan:     During this hospitalization, patient to be seen 5 x/week to address the identified rehab impairments via gait training, therapeutic activities, therapeutic exercises, neuromuscular re-education and progress toward the following goals:    Plan of Care Expires:       Subjective     Chief Complaint: Patient is agreeable to tx today without complaints   Patient/Family Comments/goals:   Pain/Comfort:  Pain Rating 1: 0/10    Patients cultural, spiritual, Latter-day conflicts given the current situation: no    Living Environment:  Patient lives alone in a single story home with no steps required for entry.  Prior to admission, patients level of function was independent.  Equipment used at home: rollator.  DME owned (not currently used): none.  Upon discharge, patient will have assistance from family.    Objective:     Communicated with nursing staff prior to session.  Patient found supine with    upon PT entry to room.    General Precautions: Standard, fall  Orthopedic Precautions:N/A   Braces: N/A  Respiratory Status: Room air    Exams:  RLE ROM: WFL  RLE Strength: Deficits: 4/5 gross  LLE ROM: WFL  LLE Strength: Deficits: 4/5 gross    Functional Mobility:  Bed  Mobility:     Rolling Left:  minimum assistance  Rolling Right: minimum assistance  Supine to Sit: contact guard assistance  Sit to Supine: contact guard assistance  Transfers:     Sit to Stand:  contact guard assistance with rolling walker  Bed to Chair: contact guard assistance with  rolling walker  using  Stand Pivot  Gait: 100 feet with use of front wheeled wlaker      AM-PAC 6 CLICK MOBILITY  Total Score:17       Patient left supine with call button in reach.    GOALS:   Multidisciplinary Problems       Physical Therapy Goals       Not on file                    History:     Past Medical History:   Diagnosis Date    Diabetes mellitus, type 2     Heart disease     Hypertension     Parkinson disease        Past Surgical History:   Procedure Laterality Date    CARDIAC DEFIBRILLATOR PLACEMENT  2018    COMBINED RIGHT AND TRANSSEPTAL (EXISTING OPENING) LEFT HEART CATHETERIZATION      defibrilator      HYSTERECTOMY      OOPHORECTOMY      REMOVAL OF HARDWARE FROM LOWER EXTREMITY Right 06/10/2021    Procedure: REMOVAL, HARDWARE, LOWER EXTREMITY, PATELLA;  Surgeon: Artis Monaco MD;  Location: [facility];  Service: Orthopedics;  Laterality: Right;       Time Tracking:     PT Received On: 05/03/24  PT Start Time: 1105     PT Stop Time: 1115  PT Total Time (min): 10 min     Billable Minutes: Evaluation 10      05/03/2024

## 2024-05-03 NOTE — PLAN OF CARE
Will provide additional calories with Boost Glucose Control BID. Encourage po intakes. RD will follow up with visit as appropriate on return to facility.

## 2024-05-03 NOTE — PLAN OF CARE
[facility]  Initial Discharge Assessment       Primary Care Provider: Arabella Ann FNP    Admission Diagnosis: Weakness [R53.1]    Admission Date: 5/2/2024  Expected Discharge Date: 5/24/2024    Transition of Care Barriers: None    Payor: HU[employer] MANAGED MEDICARE / Plan: HU[employer]NA MEDICARE PPO / Product Type: Medicare Advantage /     Extended Emergency Contact Information  Primary Emergency Contact: Caridad Vásquez  Mobile Phone: [phone]  Relation: Daughter  Preferred language: English   needed? No    Discharge Plan A: Home, Home Health         [facility] [address] [place] [address] - [address]  [address] [facility] [address]  Phone: [phone] Fax: [phone]    [facility] [place] [place] - [address]  [address] [facility] [address]  Phone: [phone] Fax: [phone]      Initial Assessment (most recent)       Adult Discharge Assessment - 05/03/24 1235          Discharge Assessment    Assessment Type Discharge Planning Assessment     Confirmed/corrected address, phone number and insurance Yes     Confirmed Demographics Correct on Facesheet     Source of Information patient     Communicated JENNIE with patient/caregiver Yes     Reason For Admission weakness     People in Home alone     Facility Arrived From: [facility]     Do you expect to return to your current living situation? Yes     Do you have help at home or someone to help you manage your care at home? No     Current cognitive status: Alert/Oriented     Walking or Climbing Stairs Difficulty yes     Walking or Climbing Stairs ambulation difficulty, requires equipment;stair climbing difficulty, requires equipment;transferring difficulty, requires equipment     Mobility Management rolator     Dressing/Bathing Difficulty no     Do you have any problems with: Errands/Grocery     Home Accessibility not wheelchair accessible     Home Layout Able to live on 1st floor     Equipment  Currently Used at Home rollator     Readmission within 30 days? No     Patient currently being followed by outpatient case management? No     Do you currently have service(s) that help you manage your care at home? No     Do you take prescription medications? Yes     Do you have prescription coverage? Yes     Coverage [employer]     Do you have any problems affording any of your prescribed medications? No     Is the patient taking medications as prescribed? yes     Who is going to help you get home at discharge? ravi daughter     How do you get to doctors appointments? family or friend will provide     Are you on dialysis? No     Do you take coumadin? No     Discharge Plan A Home;Home Health     DME Needed Upon Discharge  none     Discharge Plan discussed with: Patient     Transition of Care Barriers None        Physical Activity    On average, how many days per week do you engage in moderate to strenuous exercise (like a brisk walk)? 0 days     On average, how many minutes do you engage in exercise at this level? 0 min        Financial Resource Strain    How hard is it for you to pay for the very basics like food, housing, medical care, and heating? Not hard at all        Housing Stability    In the last 12 months, was there a time when you were not able to pay the mortgage or rent on time? No     At any time in the past 12 months, were you homeless or living in a shelter (including now)? No        Transportation Needs    In the past 12 months, has lack of transportation kept you from medical appointments or from getting medications? No     In the past 12 months, has lack of transportation kept you from meetings, work, or from getting things needed for daily living? No        Food Insecurity    Within the past 12 months, you worried that your food would run out before you got the money to buy more. Never true     Within the past 12 months, the food you bought just didn't last and you didn't have money to get more.  Never true        Stress    Do you feel stress - tense, restless, nervous, or anxious, or unable to sleep at night because your mind is troubled all the time - these days? Not at all        Alcohol Use    Q1: How often do you have a drink containing alcohol? Never     Q2: How many drinks containing alcohol do you have on a typical day when you are drinking? Patient does not drink     Q3: How often do you have six or more drinks on one occasion? Never                   Pt admitted for pt and ot she livers alone but daughter checks on he morning and evenings, she uses a rolator and does not have home health but is interested . Discharge plan is home with home health

## 2024-05-03 NOTE — PLAN OF CARE
Problem: Adult Inpatient Plan of Care  Goal: Plan of Care Review  Outcome: Progressing     Problem: Diabetes Comorbidity  Goal: Blood Glucose Level Within Targeted Range  Outcome: Progressing

## 2024-05-03 NOTE — HPI
Mrs. Lucas is a very pleasant 79 y/o AAF with a PMHx significant for HTN, Thyroid Disease, Dementia, Combined Systolic and Diastolic Heart Failure, GERD, Hyperlipidemia, Depression, and Type 1 DM presented to the Emergency Department at [facility] for frequent falls and altered mental status. CT Head w/o contrast was negative for any acute intracranial process and patient was subsequently diagnosed with metabolic encephalopathy related to intermittent hypoglycemia along with dementia.    On interview she seems lucid.  Feeling somewhat weak overall.  Looking forward to Rehab services.

## 2024-05-03 NOTE — PLAN OF CARE
Problem: Occupational Therapy  Goal: Occupational Therapy Goal  Description: STG:  Pt will perform grooming with I  Pt will bathe self with SVN  Pt will perform UB dressing with SVN  Pt will perform LB dressing with SVN  Pt will transfer to toilet with SBA  Pt will perform standing task x 3 min with SBA    LTG:  Pt will bathe self with I  Pt will perform UB dressing with I  Pt will perform LB dressing with I  Pt will transfer to toilet with I  Pt will perform standing task x 5 min with SBA   Outcome: Progressing

## 2024-05-04 LAB
GLUCOSE SERPL-MCNC: 130 MG/DL (ref 70–105)
GLUCOSE SERPL-MCNC: 207 MG/DL (ref 70–105)

## 2024-05-04 PROCEDURE — 11000004 HC SNF PRIVATE

## 2024-05-04 PROCEDURE — 82962 GLUCOSE BLOOD TEST: CPT

## 2024-05-04 PROCEDURE — 63600175 PHARM REV CODE 636 W HCPCS

## 2024-05-04 PROCEDURE — 25000003 PHARM REV CODE 250: Performed by: NURSE PRACTITIONER

## 2024-05-04 PROCEDURE — 25000003 PHARM REV CODE 250

## 2024-05-04 RX ADMIN — LEVOTHYROXINE SODIUM 25 MCG: 25 TABLET ORAL at 06:05

## 2024-05-04 RX ADMIN — ASPIRIN 81 MG: 81 TABLET, COATED ORAL at 08:05

## 2024-05-04 RX ADMIN — CARVEDILOL 12.5 MG: 6.25 TABLET, FILM COATED ORAL at 08:05

## 2024-05-04 RX ADMIN — ATORVASTATIN CALCIUM 80 MG: 40 TABLET, FILM COATED ORAL at 09:05

## 2024-05-04 RX ADMIN — LATANOPROST 1 DROP: 50 SOLUTION OPHTHALMIC at 09:05

## 2024-05-04 RX ADMIN — SERTRALINE HYDROCHLORIDE 25 MG: 25 TABLET ORAL at 08:05

## 2024-05-04 RX ADMIN — PANTOPRAZOLE SODIUM 40 MG: 40 TABLET, DELAYED RELEASE ORAL at 08:05

## 2024-05-04 RX ADMIN — ISOSORBIDE MONONITRATE 30 MG: 30 TABLET, EXTENDED RELEASE ORAL at 08:05

## 2024-05-04 RX ADMIN — SPIRONOLACTONE 25 MG: 25 TABLET, FILM COATED ORAL at 08:05

## 2024-05-04 RX ADMIN — CARVEDILOL 12.5 MG: 6.25 TABLET, FILM COATED ORAL at 09:05

## 2024-05-04 RX ADMIN — INSULIN DETEMIR 10 UNITS: 100 INJECTION, SOLUTION SUBCUTANEOUS at 08:05

## 2024-05-04 RX ADMIN — SULFAMETHOXAZOLE AND TRIMETHOPRIM 1 TABLET: 800; 160 TABLET ORAL at 08:05

## 2024-05-04 NOTE — PLAN OF CARE
Problem: Skin Injury Risk Increased  Goal: Skin Health and Integrity  Outcome: Progressing     Problem: Diabetes Comorbidity  Goal: Blood Glucose Level Within Targeted Range  Outcome: Progressing

## 2024-05-05 LAB
GLUCOSE SERPL-MCNC: 106 MG/DL (ref 70–105)
GLUCOSE SERPL-MCNC: 119 MG/DL (ref 70–105)
UA COMPLETE W REFLEX CULTURE PNL UR: ABNORMAL
UA COMPLETE W REFLEX CULTURE PNL UR: ABNORMAL

## 2024-05-05 PROCEDURE — 25000003 PHARM REV CODE 250: Performed by: NURSE PRACTITIONER

## 2024-05-05 PROCEDURE — 63600175 PHARM REV CODE 636 W HCPCS

## 2024-05-05 PROCEDURE — 11000004 HC SNF PRIVATE

## 2024-05-05 PROCEDURE — 99305 1ST NF CARE MODERATE MDM 35: CPT | Mod: AI,,, | Performed by: FAMILY MEDICINE

## 2024-05-05 PROCEDURE — 25000003 PHARM REV CODE 250

## 2024-05-05 PROCEDURE — 82962 GLUCOSE BLOOD TEST: CPT

## 2024-05-05 RX ADMIN — SPIRONOLACTONE 25 MG: 25 TABLET, FILM COATED ORAL at 09:05

## 2024-05-05 RX ADMIN — CARVEDILOL 12.5 MG: 6.25 TABLET, FILM COATED ORAL at 09:05

## 2024-05-05 RX ADMIN — PANTOPRAZOLE SODIUM 40 MG: 40 TABLET, DELAYED RELEASE ORAL at 09:05

## 2024-05-05 RX ADMIN — ATORVASTATIN CALCIUM 80 MG: 40 TABLET, FILM COATED ORAL at 09:05

## 2024-05-05 RX ADMIN — ASPIRIN 81 MG: 81 TABLET, COATED ORAL at 09:05

## 2024-05-05 RX ADMIN — INSULIN DETEMIR 10 UNITS: 100 INJECTION, SOLUTION SUBCUTANEOUS at 09:05

## 2024-05-05 RX ADMIN — LEVOTHYROXINE SODIUM 25 MCG: 25 TABLET ORAL at 05:05

## 2024-05-05 RX ADMIN — SULFAMETHOXAZOLE AND TRIMETHOPRIM 1 TABLET: 800; 160 TABLET ORAL at 09:05

## 2024-05-05 RX ADMIN — LATANOPROST 1 DROP: 50 SOLUTION OPHTHALMIC at 09:05

## 2024-05-05 RX ADMIN — ISOSORBIDE MONONITRATE 30 MG: 30 TABLET, EXTENDED RELEASE ORAL at 09:05

## 2024-05-05 RX ADMIN — SERTRALINE HYDROCHLORIDE 25 MG: 25 TABLET ORAL at 09:05

## 2024-05-06 LAB
GLUCOSE SERPL-MCNC: 206 MG/DL (ref 70–105)
GLUCOSE SERPL-MCNC: 309 MG/DL (ref 70–105)
GLUCOSE SERPL-MCNC: 415 MG/DL (ref 70–105)
GLUCOSE SERPL-MCNC: 49 MG/DL (ref 70–105)
GLUCOSE SERPL-MCNC: 51 MG/DL (ref 70–105)
GLUCOSE SERPL-MCNC: 69 MG/DL (ref 70–105)

## 2024-05-06 PROCEDURE — 11000004 HC SNF PRIVATE

## 2024-05-06 PROCEDURE — 97116 GAIT TRAINING THERAPY: CPT | Mod: CQ

## 2024-05-06 PROCEDURE — 92507 TX SP LANG VOICE COMM INDIV: CPT

## 2024-05-06 PROCEDURE — 82962 GLUCOSE BLOOD TEST: CPT

## 2024-05-06 PROCEDURE — 25000003 PHARM REV CODE 250: Performed by: NURSE PRACTITIONER

## 2024-05-06 PROCEDURE — 25000003 PHARM REV CODE 250

## 2024-05-06 PROCEDURE — 63600175 PHARM REV CODE 636 W HCPCS: Performed by: REGISTERED NURSE

## 2024-05-06 PROCEDURE — 97535 SELF CARE MNGMENT TRAINING: CPT

## 2024-05-06 PROCEDURE — 97110 THERAPEUTIC EXERCISES: CPT | Mod: CQ

## 2024-05-06 PROCEDURE — 63600175 PHARM REV CODE 636 W HCPCS

## 2024-05-06 PROCEDURE — 97110 THERAPEUTIC EXERCISES: CPT

## 2024-05-06 RX ORDER — INSULIN ASPART 100 [IU]/ML
0-10 INJECTION, SOLUTION INTRAVENOUS; SUBCUTANEOUS
Status: DISCONTINUED | OUTPATIENT
Start: 2024-05-06 | End: 2024-05-17 | Stop reason: HOSPADM

## 2024-05-06 RX ORDER — IBUPROFEN 200 MG
16 TABLET ORAL
Status: DISCONTINUED | OUTPATIENT
Start: 2024-05-06 | End: 2024-05-17 | Stop reason: HOSPADM

## 2024-05-06 RX ORDER — IBUPROFEN 200 MG
24 TABLET ORAL
Status: DISCONTINUED | OUTPATIENT
Start: 2024-05-06 | End: 2024-05-17 | Stop reason: HOSPADM

## 2024-05-06 RX ORDER — GLUCAGON 1 MG
1 KIT INJECTION
Status: DISCONTINUED | OUTPATIENT
Start: 2024-05-06 | End: 2024-05-17 | Stop reason: HOSPADM

## 2024-05-06 RX ADMIN — INSULIN ASPART 10 UNITS: 100 INJECTION, SOLUTION INTRAVENOUS; SUBCUTANEOUS at 05:05

## 2024-05-06 RX ADMIN — ASPIRIN 81 MG: 81 TABLET, COATED ORAL at 08:05

## 2024-05-06 RX ADMIN — LATANOPROST 1 DROP: 50 SOLUTION OPHTHALMIC at 08:05

## 2024-05-06 RX ADMIN — SULFAMETHOXAZOLE AND TRIMETHOPRIM 1 TABLET: 800; 160 TABLET ORAL at 08:05

## 2024-05-06 RX ADMIN — PANTOPRAZOLE SODIUM 40 MG: 40 TABLET, DELAYED RELEASE ORAL at 08:05

## 2024-05-06 RX ADMIN — ATORVASTATIN CALCIUM 80 MG: 40 TABLET, FILM COATED ORAL at 08:05

## 2024-05-06 RX ADMIN — CARVEDILOL 12.5 MG: 6.25 TABLET, FILM COATED ORAL at 08:05

## 2024-05-06 RX ADMIN — SERTRALINE HYDROCHLORIDE 25 MG: 25 TABLET ORAL at 08:05

## 2024-05-06 RX ADMIN — SPIRONOLACTONE 25 MG: 25 TABLET, FILM COATED ORAL at 08:05

## 2024-05-06 RX ADMIN — ISOSORBIDE MONONITRATE 30 MG: 30 TABLET, EXTENDED RELEASE ORAL at 08:05

## 2024-05-06 RX ADMIN — INSULIN DETEMIR 10 UNITS: 100 INJECTION, SOLUTION SUBCUTANEOUS at 09:05

## 2024-05-06 RX ADMIN — LEVOTHYROXINE SODIUM 25 MCG: 25 TABLET ORAL at 06:05

## 2024-05-06 NOTE — PLAN OF CARE
[facility]  Discharge Reassessment    Primary Care Provider: Arabella Ann FNP    Expected Discharge Date: 5/24/2024    Reassessment (most recent)       Discharge Reassessment - 05/06/24 1042          Discharge Reassessment    Assessment Type Discharge Planning Brief Assessment     Did the patient's condition or plan change since previous assessment? No     Discharge Plan discussed with: Patient     Communicated JENNIE with patient/caregiver Date not available/Unable to determine     Discharge Plan A Home;Home Health     DME Needed Upon Discharge  none     Transition of Care Barriers None     Why the patient remains in the hospital Requires continued medical care        Post-Acute Status    Post-Acute Authorization Home Health     Coverage [employer]     Patient choice form signed by patient/caregiver List with quality metrics by geographic area provided     Discharge Delays None known at this time                   Pt lives alone and daughter checks on her am and evenings , she will need homehalth at discharge and has all needed DME .

## 2024-05-06 NOTE — PLAN OF CARE
Care plan reviewed  Problem: Adult Inpatient Plan of Care  Goal: Plan of Care Review  Outcome: Progressing     Problem: Adult Inpatient Plan of Care  Goal: Patient-Specific Goal (Individualized)  Outcome: Progressing     Problem: Adult Inpatient Plan of Care  Goal: Absence of Hospital-Acquired Illness or Injury  Outcome: Progressing     Problem: Adult Inpatient Plan of Care  Goal: Optimal Comfort and Wellbeing  Outcome: Progressing     Problem: Adult Inpatient Plan of Care  Goal: Readiness for Transition of Care  Outcome: Progressing

## 2024-05-06 NOTE — ASSESSMENT & PLAN NOTE
"Patient's FSGs are controlled on current medication regimen.  Last A1c reviewed-   Lab Results   Component Value Date    HGBA1C 7.8 (H) 05/02/2024     Most recent fingerstick glucose reviewed- No results for input(s): "POCTGLUCOSE" in the last 24 hours.  Current correctional scale  Low  Maintain anti-hyperglycemic dose as follows-   Antihyperglycemics (From admission, onward)      Start     Stop Route Frequency Ordered    05/03/24 0900  insulin detemir U-100 injection 10 Units         -- SubQ Daily 05/02/24 1821          Hold Oral hypoglycemics while patient is in the hospital.  "

## 2024-05-06 NOTE — H&P
[facility]  Hospital Medicine  History & Physical    Patient Name: Jeanie Lucas  MRN: [id number]  Patient Class: IP- Swing  Admission Date: 5/2/2024  Attending Physician: Jose Calzaad Jr., MD   Primary Care Provider: Arabella Ann FNP         Patient information was obtained from patient and past medical records.     Subjective:     Principal Problem:Weakness    Chief Complaint:   Chief Complaint   Patient presents with    Extremity Weakness        HPI: Mrs. Lucas is a very pleasant 77 y/o AAF with a PMHx significant for HTN, Thyroid Disease, Dementia, Combined Systolic and Diastolic Heart Failure, GERD, Hyperlipidemia, Depression, and Type 1 DM presented to the Emergency Department at [facility] in [place] for frequent falls and altered mental status. CT Head w/o contrast was negative for any acute intracranial process and patient was subsequently diagnosed with metabolic encephalopathy related to intermittent hypoglycemia along with dementia.    On interview she seems lucid.  Feeling somewhat weak overall.  Looking forward to Rehab services.     Past Medical History:   Diagnosis Date    Diabetes mellitus, type 2     Heart disease     Hypertension     Parkinson disease        Past Surgical History:   Procedure Laterality Date    CARDIAC DEFIBRILLATOR PLACEMENT  2018    COMBINED RIGHT AND TRANSSEPTAL (EXISTING OPENING) LEFT HEART CATHETERIZATION      defibrilator      HYSTERECTOMY      OOPHORECTOMY      REMOVAL OF HARDWARE FROM LOWER EXTREMITY Right 06/10/2021    Procedure: REMOVAL, HARDWARE, LOWER EXTREMITY, PATELLA;  Surgeon: Artis Monaco MD;  Location: [facility];  Service: Orthopedics;  Laterality: Right;       Review of patient's allergies indicates:   Allergen Reactions    Cefuroxime Swelling       Current Facility-Administered Medications   Medication Dose Route Frequency Provider Last Rate Last Admin    acetaminophen tablet 650 mg  650 mg  Oral Q6H PRN Art Easley FNP        aspirin EC tablet 81 mg  81 mg Oral Daily Art Easley FNP   81 mg at 05/05/24 0913    atorvastatin tablet 80 mg  80 mg Oral QHS Art Easley FNP   80 mg at 05/04/24 2105    calcium carbonate 200 mg calcium (500 mg) chewable tablet 500 mg  500 mg Oral BID PRN Art Easley FNP        carvediloL tablet 12.5 mg  12.5 mg Oral BID Art Easley FNP   12.5 mg at 05/05/24 0914    dextrose 10% bolus 125 mL 125 mL  12.5 g Intravenous PRN Jose Calzada Jr., MD        dextrose 10% bolus 250 mL 250 mL  25 g Intravenous PRN Jose Calzada Jr., MD        furosemide tablet 20 mg  20 mg Oral Daily PRN Art Easley FNP        insulin detemir U-100 injection 10 Units  10 Units Subcutaneous Daily Art Easley FNP   10 Units at 05/05/24 0914    isosorbide mononitrate 24 hr tablet 30 mg  30 mg Oral Daily Art Easley FNP   30 mg at 05/05/24 0913    latanoprost 0.005 % ophthalmic solution 1 drop  1 drop Left Eye Q Art Easley FNP   1 drop at 05/04/24 2105    levothyroxine tablet 25 mcg  25 mcg Oral Before breakfast Art Easley FNP   25 mcg at 05/05/24 0542    melatonin tablet 6 mg  6 mg Oral Nightly PRN Art Easley FNP        pantoprazole EC tablet 40 mg  40 mg Oral Daily Art Easley FNP   40 mg at 05/05/24 0914    senna-docusate 8.6-50 mg per tablet 1 tablet  1 tablet Oral BID PRN Art Easley FNP        sertraline tablet 25 mg  25 mg Oral Daily Art Easley FNP   25 mg at 05/05/24 0914    spironolactone tablet 25 mg  25 mg Oral Daily Art Easley FNP   25 mg at 05/05/24 0914    sulfamethoxazole-trimethoprim 800-160mg per tablet 1 tablet  1 tablet Oral Daily Tresa Seymour FNP   1 tablet at 05/05/24 0914     Family History       Problem Relation (Age of Onset)    Diabetes Mother    Heart disease Mother, Father          Tobacco Use    Smoking status: Never    Smokeless tobacco: Never   Substance and Sexual  Activity    Alcohol use: Never    Drug use: Never    Sexual activity: Not on file     Review of Systems   Constitutional:  Negative for chills and fever.   HENT:  Negative for sinus pain.    Eyes:  Negative for pain and visual disturbance.   Respiratory:  Negative for chest tightness and shortness of breath.    Cardiovascular:  Negative for chest pain and palpitations.   Gastrointestinal:  Negative for abdominal distention and abdominal pain.   Endocrine: Negative for polydipsia and polyuria.   Genitourinary:  Negative for dysuria.   Musculoskeletal:  Negative for back pain and neck pain.   Skin:  Negative for color change and rash.   Neurological:  Negative for dizziness, seizures and numbness.   Hematological:  Negative for adenopathy.   Psychiatric/Behavioral:  Negative for agitation and decreased concentration.      Objective:     Vital Signs (Most Recent):  Temp: 97.9 °F (36.6 °C) (05/05/24 0730)  Pulse: 61 (05/05/24 0730)  Resp: 16 (05/05/24 0730)  BP: 128/77 (05/05/24 0730)  SpO2: 97 % (05/05/24 0730) Vital Signs (24h Range):  Temp:  [97.9 °F (36.6 °C)] 97.9 °F (36.6 °C)  Pulse:  [61] 61  Resp:  [16] 16  SpO2:  [97 %] 97 %  BP: (128)/(77) 128/77     Weight: 53.4 kg (117 lb 12.8 oz)  Body mass index is 21.55 kg/m².     Physical Exam  Constitutional:       General: She is not in acute distress.     Appearance: She is not toxic-appearing.   HENT:      Head: Normocephalic and atraumatic.      Right Ear: External ear normal.      Left Ear: External ear normal.      Nose: Nose normal.      Mouth/Throat:      Mouth: Mucous membranes are moist.      Pharynx: Oropharynx is clear.   Eyes:      General: No scleral icterus.     Extraocular Movements: Extraocular movements intact.      Pupils: Pupils are equal, round, and reactive to light.   Cardiovascular:      Rate and Rhythm: Normal rate. Rhythm irregular.      Heart sounds: Normal heart sounds.   Pulmonary:      Effort: Pulmonary effort is normal. No respiratory  "distress.      Breath sounds: Normal breath sounds. No wheezing.   Abdominal:      General: Abdomen is flat. Bowel sounds are normal. There is no distension.      Palpations: Abdomen is soft.   Musculoskeletal:      Right lower leg: No edema.      Left lower leg: No edema.   Skin:     General: Skin is warm and dry.      Capillary Refill: Capillary refill takes less than 2 seconds.   Neurological:      General: No focal deficit present.      Mental Status: She is alert and oriented to person, place, and time.   Psychiatric:         Mood and Affect: Mood normal.         Behavior: Behavior normal.              CRANIAL NERVES     CN III, IV, VI   Pupils are equal, round, and reactive to light.       Significant Labs: All pertinent labs within the past 24 hours have been reviewed.  BMP: No results for input(s): "GLU", "NA", "K", "CL", "CO2", "BUN", "CREATININE", "CALCIUM", "MG" in the last 48 hours.  CBC: No results for input(s): "WBC", "HGB", "HCT", "PLT" in the last 48 hours.    Significant Imaging: I have reviewed all pertinent imaging results/findings within the past 24 hours.  Assessment/Plan:     * Weakness    PT eval and treat  OT eval and treat    Recurrent major depressive disorder, in remission  Patient has persistent depression which is mild and is currently controlled. Will Continue anti-depressant medications. We will not consult psychiatry at this time. Patient does not display psychosis at this time. Continue to monitor closely and adjust plan of care as needed.        Hyperlipidemia    Substitute lipitor 80 for home crestor 40.    Gastroesophageal reflux disease without esophagitis    Protonix 40mg daily    Type 1 diabetes mellitus with hypoglycemia  Patient's FSGs are controlled on current medication regimen.  Last A1c reviewed-   Lab Results   Component Value Date    HGBA1C 7.8 (H) 05/02/2024     Most recent fingerstick glucose reviewed- No results for input(s): "POCTGLUCOSE" in the last 24 " hours.  Current correctional scale  Low  Maintain anti-hyperglycemic dose as follows-   Antihyperglycemics (From admission, onward)      Start     Stop Route Frequency Ordered    05/03/24 0900  insulin detemir U-100 injection 10 Units         -- SubQ Daily 05/02/24 1821          Hold Oral hypoglycemics while patient is in the hospital.    Thyroid disease    Continue home dose synthroid. 25 mcg daily    Primary hypertension  Chronic, controlled. Latest blood pressure and vitals reviewed-     Temp:  [97.9 °F (36.6 °C)]   Pulse:  [61]   Resp:  [16]   BP: (128)/(77)   SpO2:  [97 %] .   Home meds for hypertension were reviewed and noted below.   Hypertension Medications               carvediloL (COREG) 12.5 MG tablet Take 12.5 mg by mouth 2 (two) times daily.    furosemide (LASIX) 20 MG tablet Take 20 mg by mouth daily as needed (lower extremity edema).    isosorbide mononitrate (IMDUR) 30 MG 24 hr tablet Take 30 mg by mouth once daily.    spironolactone (ALDACTONE) 25 MG tablet Take 25 mg by mouth once daily.            While in the hospital, will manage blood pressure as follows; Continue home antihypertensive regimen    Will utilize p.r.n. blood pressure medication only if patient's blood pressure greater than 180/110 and she develops symptoms such as worsening chest pain or shortness of breath.      VTE Risk Mitigation (From admission, onward)           Ordered     IP VTE HIGH RISK PATIENT  Once         05/02/24 1758     Place sequential compression device  Until discontinued         05/02/24 1758                                   Jose Calzada Jr, MD  Department of Hospital Medicine  [facility]

## 2024-05-06 NOTE — ASSESSMENT & PLAN NOTE
Chronic, controlled. Latest blood pressure and vitals reviewed-     Temp:  [97.9 °F (36.6 °C)]   Pulse:  [61]   Resp:  [16]   BP: (128)/(77)   SpO2:  [97 %] .   Home meds for hypertension were reviewed and noted below.   Hypertension Medications               carvediloL (COREG) 12.5 MG tablet Take 12.5 mg by mouth 2 (two) times daily.    furosemide (LASIX) 20 MG tablet Take 20 mg by mouth daily as needed (lower extremity edema).    isosorbide mononitrate (IMDUR) 30 MG 24 hr tablet Take 30 mg by mouth once daily.    spironolactone (ALDACTONE) 25 MG tablet Take 25 mg by mouth once daily.            While in the hospital, will manage blood pressure as follows; Continue home antihypertensive regimen    Will utilize p.r.n. blood pressure medication only if patient's blood pressure greater than 180/110 and she develops symptoms such as worsening chest pain or shortness of breath.

## 2024-05-06 NOTE — PT/OT/SLP PROGRESS
Speech Language Pathology Treatment    Patient Name:  Jeanie Lucas   MRN:  [id number]  Admitting Diagnosis: Weakness    Recommendations:                 General Recommendations:  Cognitive-linguistic therapy  Diet recommendations:  Regular, Liquid Diet Level: Thin   Aspiration Precautions: Standard aspiration precautions   General Precautions: Standard, fall  Communication strategies:  none    Assessment:     Jeanie Lucas is a 78 y.o. female with an SLP diagnosis of Cognitive-Linguistic Impairment.  She presents with hospital admission and following SWB admission secondary to frequent falls, altered mental status due to metabolic encephalopathy.     Pt presents with change in cognitive status, increased falls, decreased safety awareness, decline in independence with ADLs       Subjective     Pt alert upright in chair, agreeable to ST  Patient goals: ready to get back home      Pain/Comfort:       Respiratory Status: Room air    Objective:     Has the patient been evaluated by SLP for swallowing?   Yes  Keep patient NPO? No   Current Respiratory Status:        Tasks for increased orientation across 15 trials with min assist.   Recall 3 details with 75% accuracy given repetition at times.   Reasoning and problem solving tasks completed for determining solution and verification of accurate solutions chosen in given situation with 64% accuracy given min verbal instructions.     Goals:   Multidisciplinary Problems       SLP Goals          Problem: SLP    Goal Priority Disciplines Outcome   SLP Goal     SLP Progressing   Description: Short Term Goals:  Pt will demonstrate orientation to person, place, time with 60% accuracy.  Pt will recall 3 details with 60% accuracy.   Pt will demonstrate ID safe/unsafe situations with 60% accuracy.   Pt will demonstrate problem solving and reasoning skills with 60% accuracy.     Long Term Goals:  Pt will demonstrate orientation to person, place, time with 80% accuracy.  Pt will recall 3  details with 80% accuracy.   Pt will demonstrate ID safe/unsafe situations with 80% accuracy.   Pt will demonstrate problem solving and reasoning skills with 80% accuracy.                        Plan:     Patient to be seen:  5 x/week   Plan of Care expires:  05/24/24  Plan of Care reviewed with:  patient   SLP Follow-Up:  Yes       Discharge recommendations:  Moderate Intensity Therapy   Barriers to Discharge:  Level of Skilled Assistance Needed currently, decline in cognition and ADLs    Time Tracking:     SLP Treatment Date:   05/06/24  Speech Start Time:  1400  Speech Stop Time:  1425     Speech Total Time (min):  25 min    Billable Minutes: Speech Therapy Individual 25    05/06/2024

## 2024-05-06 NOTE — SUBJECTIVE & OBJECTIVE
Past Medical History:   Diagnosis Date    Diabetes mellitus, type 2     Heart disease     Hypertension     Parkinson disease        Past Surgical History:   Procedure Laterality Date    CARDIAC DEFIBRILLATOR PLACEMENT  2018    COMBINED RIGHT AND TRANSSEPTAL (EXISTING OPENING) LEFT HEART CATHETERIZATION      defibrilator      HYSTERECTOMY      OOPHORECTOMY      REMOVAL OF HARDWARE FROM LOWER EXTREMITY Right 06/10/2021    Procedure: REMOVAL, HARDWARE, LOWER EXTREMITY, PATELLA;  Surgeon: Artis Monaco MD;  Location: [facility];  Service: Orthopedics;  Laterality: Right;       Review of patient's allergies indicates:   Allergen Reactions    Cefuroxime Swelling       Current Facility-Administered Medications   Medication Dose Route Frequency Provider Last Rate Last Admin    acetaminophen tablet 650 mg  650 mg Oral Q6H PRN Art Easley FNP        aspirin EC tablet 81 mg  81 mg Oral Daily Art Easley FNP   81 mg at 05/05/24 0913    atorvastatin tablet 80 mg  80 mg Oral QHS Art Easley FNP   80 mg at 05/04/24 2105    calcium carbonate 200 mg calcium (500 mg) chewable tablet 500 mg  500 mg Oral BID PRN Art Easley FNP        carvediloL tablet 12.5 mg  12.5 mg Oral BID rAt Easley FNP   12.5 mg at 05/05/24 0914    dextrose 10% bolus 125 mL 125 mL  12.5 g Intravenous PRN Jose Calzada Jr., MD        dextrose 10% bolus 250 mL 250 mL  25 g Intravenous PRN Jose Calzada Jr., MD        furosemide tablet 20 mg  20 mg Oral Daily PRN Art Easley FNP        insulin detemir U-100 injection 10 Units  10 Units Subcutaneous Daily Art Easley FNP   10 Units at 05/05/24 0914    isosorbide mononitrate 24 hr tablet 30 mg  30 mg Oral Daily Art Easley FNP   30 mg at 05/05/24 0913    latanoprost 0.005 % ophthalmic solution 1 drop  1 drop Left Eye Q Art Easley FNP   1 drop at 05/04/24 2105    levothyroxine tablet 25 mcg  25 mcg Oral Before breakfast Art Easley FNP    25 mcg at 05/05/24 0542    melatonin tablet 6 mg  6 mg Oral Nightly PRN Art Easley FNP        pantoprazole EC tablet 40 mg  40 mg Oral Daily Art Easley FNP   40 mg at 05/05/24 0914    senna-docusate 8.6-50 mg per tablet 1 tablet  1 tablet Oral BID PRN Art Easley FNP        sertraline tablet 25 mg  25 mg Oral Daily Art Easley FNP   25 mg at 05/05/24 0914    spironolactone tablet 25 mg  25 mg Oral Daily Art Easley FNP   25 mg at 05/05/24 0914    sulfamethoxazole-trimethoprim 800-160mg per tablet 1 tablet  1 tablet Oral Daily Tresa Seymour FNP   1 tablet at 05/05/24 0914     Family History       Problem Relation (Age of Onset)    Diabetes Mother    Heart disease Mother, Father          Tobacco Use    Smoking status: Never    Smokeless tobacco: Never   Substance and Sexual Activity    Alcohol use: Never    Drug use: Never    Sexual activity: Not on file     Review of Systems   Constitutional:  Negative for chills and fever.   HENT:  Negative for sinus pain.    Eyes:  Negative for pain and visual disturbance.   Respiratory:  Negative for chest tightness and shortness of breath.    Cardiovascular:  Negative for chest pain and palpitations.   Gastrointestinal:  Negative for abdominal distention and abdominal pain.   Endocrine: Negative for polydipsia and polyuria.   Genitourinary:  Negative for dysuria.   Musculoskeletal:  Negative for back pain and neck pain.   Skin:  Negative for color change and rash.   Neurological:  Negative for dizziness, seizures and numbness.   Hematological:  Negative for adenopathy.   Psychiatric/Behavioral:  Negative for agitation and decreased concentration.      Objective:     Vital Signs (Most Recent):  Temp: 97.9 °F (36.6 °C) (05/05/24 0730)  Pulse: 61 (05/05/24 0730)  Resp: 16 (05/05/24 0730)  BP: 128/77 (05/05/24 0730)  SpO2: 97 % (05/05/24 0730) Vital Signs (24h Range):  Temp:  [97.9 °F (36.6 °C)] 97.9 °F (36.6 °C)  Pulse:  [61] 61  Resp:  [16]  "16  SpO2:  [97 %] 97 %  BP: (128)/(77) 128/77     Weight: 53.4 kg (117 lb 12.8 oz)  Body mass index is 21.55 kg/m².     Physical Exam  Constitutional:       General: She is not in acute distress.     Appearance: She is not toxic-appearing.   HENT:      Head: Normocephalic and atraumatic.      Right Ear: External ear normal.      Left Ear: External ear normal.      Nose: Nose normal.      Mouth/Throat:      Mouth: Mucous membranes are moist.      Pharynx: Oropharynx is clear.   Eyes:      General: No scleral icterus.     Extraocular Movements: Extraocular movements intact.      Pupils: Pupils are equal, round, and reactive to light.   Cardiovascular:      Rate and Rhythm: Normal rate. Rhythm irregular.      Heart sounds: Normal heart sounds.   Pulmonary:      Effort: Pulmonary effort is normal. No respiratory distress.      Breath sounds: Normal breath sounds. No wheezing.   Abdominal:      General: Abdomen is flat. Bowel sounds are normal. There is no distension.      Palpations: Abdomen is soft.   Musculoskeletal:      Right lower leg: No edema.      Left lower leg: No edema.   Skin:     General: Skin is warm and dry.      Capillary Refill: Capillary refill takes less than 2 seconds.   Neurological:      General: No focal deficit present.      Mental Status: She is alert and oriented to person, place, and time.   Psychiatric:         Mood and Affect: Mood normal.         Behavior: Behavior normal.              CRANIAL NERVES     CN III, IV, VI   Pupils are equal, round, and reactive to light.       Significant Labs: All pertinent labs within the past 24 hours have been reviewed.  BMP: No results for input(s): "GLU", "NA", "K", "CL", "CO2", "BUN", "CREATININE", "CALCIUM", "MG" in the last 48 hours.  CBC: No results for input(s): "WBC", "HGB", "HCT", "PLT" in the last 48 hours.    Significant Imaging: I have reviewed all pertinent imaging results/findings within the past 24 hours.  "

## 2024-05-06 NOTE — PT/OT/SLP PROGRESS
Physical Therapy Treatment    Patient Name:  Jeanie Lucas   MRN:  [id number]    Recommendations:     Discharge Recommendations: Low Intensity Therapy  Discharge Equipment Recommendations: none  Barriers to discharge: Decreased caregiver support    Assessment:     Jeanie Lucas is a 78 y.o. female admitted with a medical diagnosis of Weakness.  She presents with the following impairments/functional limitations: weakness, impaired endurance .    Case conference held with PT, to discuss pt POC and Tx.    Rehab Prognosis: Good; patient would benefit from acute skilled PT services to address these deficits and reach maximum level of function.    Recent Surgery: * No surgery found *      Plan:     During this hospitalization, patient to be seen 5 x/week to address the identified rehab impairments via gait training, therapeutic exercises and progress toward the following goals:    Plan of Care Expires:       Subjective     Chief Complaint: no pain stated  Patient/Family Comments/goals: increased BLE strengthening and endurance  Pain/Comfort:  Pain Rating 1: 0/10  Pain Rating Post-Intervention 1: 0/10  Pain Rating Post-Intervention 2: 0/10      Objective:     Communicated with pt prior to session.  Patient found up in chair with   upon PT entry to room.     General Precautions: Standard, fall  Orthopedic Precautions: N/A  Braces: N/A  Respiratory Status: Room air     Functional Mobility:  Transfers:     Sit to Stand:  contact guard assistance with pt rollator  Gait: Pt ambulated 200 ft SBA with pt rollator with no rest breaks required.       AM-PAC 6 CLICK MOBILITY          Treatment & Education:  Pt performed 2x10 of the following with 3#  LAQs  Hip ADD/ABD  Marches  HS curls with TB      Patient left up in chair with call button in reach..    GOALS:   Multidisciplinary Problems       Physical Therapy Goals       Not on file                    Time Tracking:     PT Received On: 05/06/24  PT Start Time: 0947     PT Stop Time:  1020  PT Total Time (min): 33 min     Billable Minutes: Gait Training 15 and Therapeutic Exercise 18    Treatment Type: Treatment  PT/PTA: PTA     Number of PTA visits since last PT visit: 1 05/06/2024

## 2024-05-06 NOTE — PT/OT/SLP PROGRESS
Occupational Therapy   Treatment    Name: Jeanie Lucas  MRN: [id number]  Admitting Diagnosis:  Weakness       Recommendations:     Discharge Recommendations: Low Intensity Therapy  Discharge Equipment Recommendations:  none  Barriers to discharge:  Decreased caregiver support    Assessment:     Jeanie Lucas is a 78 y.o. female with a medical diagnosis of Weakness.  She presents with the following performance deficits affecting function are weakness, impaired endurance, impaired self care skills, impaired functional mobility.     Rehab Prognosis:  Good; patient would benefit from acute skilled OT services to address these deficits and reach maximum level of function.       Plan:     Patient to be seen 5 x/week to address the above listed problems via self-care/home management, therapeutic activities, therapeutic exercises, neuromuscular re-education  Plan of Care Expires: 05/17/24  Plan of Care Reviewed with: patient    Subjective     Chief Complaint: none  Patient/Family Comments/goals: pt goal to return home  Pain/Comfort:       Objective:     Communicated with: RN prior to session.  Patient found supine with   upon OT entry to room.    General Precautions: Standard, fall    Orthopedic Precautions:N/A  Braces: N/A  Respiratory Status: Room air     Occupational Performance:     Bed Mobility:    Patient completed Rolling/Turning to Right with stand by assistance  Patient completed Supine to Sit with stand by assistance     Functional Mobility/Transfers:  Patient completed Sit <> Stand Transfer with stand by assistance  with  4 wheeled walker   Patient completed Bed <> Chair Transfer using Stand Pivot technique with stand by assistance with 4 wheeled walker  Functional Mobility: in-room mobility with SBA using rollator    Activities of Daily Living:  Upper Body Dressing: stand by assistance for doffing gown and donned LS shirt   Lower Body Dressing: stand by assistance for donning pants and standing to pull up  pants    Treatment & Education:  Patient completed 10 minutes on UBE ergometer on level 1 with no rest breaks needed, to work on UB strength and endurance in order to complete ADLs and transfers with less assist.       OT met face to face and performed supervision visit with SHIRLENE Galvan to discuss patient's diagnosis and POC including goals, intervention strategies and D/C planning.     Patient left up in chair with  PTA    GOALS:   Multidisciplinary Problems       Occupational Therapy Goals          Problem: Occupational Therapy    Goal Priority Disciplines Outcome Interventions   Occupational Therapy Goal     OT, PT/OT Progressing    Description: STG:  Pt will perform grooming with I  Pt will bathe self with SVN  Pt will perform UB dressing with SVN  Pt will perform LB dressing with SVN  Pt will transfer to toilet with SBA  Pt will perform standing task x 3 min with SBA    LTG:  Pt will bathe self with I  Pt will perform UB dressing with I  Pt will perform LB dressing with I  Pt will transfer to toilet with I  Pt will perform standing task x 5 min with SBA                        Time Tracking:     OT Date of Treatment: 05/06/24  OT Start Time: 0920  OT Stop Time: 0947  OT Total Time (min): 27 min    Billable Minutes:Self Care/Home Management 14  Therapeutic Exercise 10    OT/SUSANNA: OT          5/6/2024

## 2024-05-07 LAB
GLUCOSE SERPL-MCNC: 148 MG/DL (ref 70–105)
GLUCOSE SERPL-MCNC: 157 MG/DL (ref 70–105)
GLUCOSE SERPL-MCNC: 194 MG/DL (ref 70–105)
GLUCOSE SERPL-MCNC: 203 MG/DL (ref 70–105)

## 2024-05-07 PROCEDURE — 25000003 PHARM REV CODE 250

## 2024-05-07 PROCEDURE — 97116 GAIT TRAINING THERAPY: CPT | Mod: CQ

## 2024-05-07 PROCEDURE — 11000004 HC SNF PRIVATE

## 2024-05-07 PROCEDURE — 25000003 PHARM REV CODE 250: Performed by: FAMILY MEDICINE

## 2024-05-07 PROCEDURE — 97530 THERAPEUTIC ACTIVITIES: CPT | Mod: CO

## 2024-05-07 PROCEDURE — 97110 THERAPEUTIC EXERCISES: CPT | Mod: CQ

## 2024-05-07 PROCEDURE — 63600175 PHARM REV CODE 636 W HCPCS

## 2024-05-07 PROCEDURE — 82962 GLUCOSE BLOOD TEST: CPT

## 2024-05-07 PROCEDURE — 63600175 PHARM REV CODE 636 W HCPCS: Performed by: REGISTERED NURSE

## 2024-05-07 PROCEDURE — 25000003 PHARM REV CODE 250: Performed by: NURSE PRACTITIONER

## 2024-05-07 PROCEDURE — 92507 TX SP LANG VOICE COMM INDIV: CPT

## 2024-05-07 PROCEDURE — 97110 THERAPEUTIC EXERCISES: CPT | Mod: CO

## 2024-05-07 RX ORDER — LEVOFLOXACIN 500 MG/1
500 TABLET, FILM COATED ORAL EVERY OTHER DAY
Status: DISCONTINUED | OUTPATIENT
Start: 2024-05-09 | End: 2024-05-09

## 2024-05-07 RX ORDER — INSULIN GLARGINE 100 [IU]/ML
10 INJECTION, SOLUTION SUBCUTANEOUS DAILY
Status: DISCONTINUED | OUTPATIENT
Start: 2024-05-08 | End: 2024-05-17 | Stop reason: HOSPADM

## 2024-05-07 RX ADMIN — SERTRALINE HYDROCHLORIDE 25 MG: 25 TABLET ORAL at 08:05

## 2024-05-07 RX ADMIN — SULFAMETHOXAZOLE AND TRIMETHOPRIM 1 TABLET: 800; 160 TABLET ORAL at 08:05

## 2024-05-07 RX ADMIN — ASPIRIN 81 MG: 81 TABLET, COATED ORAL at 08:05

## 2024-05-07 RX ADMIN — PANTOPRAZOLE SODIUM 40 MG: 40 TABLET, DELAYED RELEASE ORAL at 08:05

## 2024-05-07 RX ADMIN — INSULIN ASPART 2 UNITS: 100 INJECTION, SOLUTION INTRAVENOUS; SUBCUTANEOUS at 08:05

## 2024-05-07 RX ADMIN — CARVEDILOL 12.5 MG: 6.25 TABLET, FILM COATED ORAL at 08:05

## 2024-05-07 RX ADMIN — LEVOTHYROXINE SODIUM 25 MCG: 25 TABLET ORAL at 05:05

## 2024-05-07 RX ADMIN — LEVOFLOXACIN 750 MG: 500 TABLET, FILM COATED ORAL at 10:05

## 2024-05-07 RX ADMIN — LATANOPROST 1 DROP: 50 SOLUTION OPHTHALMIC at 09:05

## 2024-05-07 RX ADMIN — INSULIN ASPART 2 UNITS: 100 INJECTION, SOLUTION INTRAVENOUS; SUBCUTANEOUS at 11:05

## 2024-05-07 RX ADMIN — INSULIN DETEMIR 10 UNITS: 100 INJECTION, SOLUTION SUBCUTANEOUS at 08:05

## 2024-05-07 RX ADMIN — ISOSORBIDE MONONITRATE 30 MG: 30 TABLET, EXTENDED RELEASE ORAL at 08:05

## 2024-05-07 RX ADMIN — SPIRONOLACTONE 25 MG: 25 TABLET, FILM COATED ORAL at 08:05

## 2024-05-07 RX ADMIN — ATORVASTATIN CALCIUM 80 MG: 40 TABLET, FILM COATED ORAL at 08:05

## 2024-05-07 NOTE — PT/OT/SLP PROGRESS
Speech Language Pathology Treatment    Patient Name:  Jeanie Lucas   MRN:  [id number]  Admitting Diagnosis: Weakness    Recommendations:                 General Recommendations:  Cognitive-linguistic therapy  Diet recommendations:  Regular, Liquid Diet Level: Thin   Aspiration Precautions: Standard aspiration precautions   General Precautions: Standard, fall  Communication strategies:  none    Assessment:     Jeanie Lucas is a 78 y.o. female with an SLP diagnosis of Cognitive-Linguistic Impairment.  She presents with hospital admission and following SWB admission secondary to frequent falls, altered mental status due to metabolic encephalopathy.     Pt presents with change in cognitive status, increased falls, decreased safety awareness, decline in independence with ADLs       Subjective     Pt awakened in bed, agreeable to speech therapy with encouragement  Patient goals: ready to get back home      Pain/Comfort:       Respiratory Status: Room air    Objective:     Has the patient been evaluated by SLP for swallowing?   Yes  Keep patient NPO? No   Current Respiratory Status:        Tasks for increased orientation via verbal expression with mod cues for month, MEHDI, year.   Problem solving for safety awareness with min to mod cues for simple ADLs of dressing/washing face/brushing hair.     Tasks in therapy room for increased reasoning and problem solving skills via determination of similarities and size discrepancy with max cues and eventual task discontinuation. ST implemented verbal cues, model, and written stimuli for engagement in task with pt having max difficulty initiating and carrying out task without [place] assist.   Pt demonstrated no awareness of errors.  Goals:   Multidisciplinary Problems       SLP Goals          Problem: SLP    Goal Priority Disciplines Outcome   SLP Goal     SLP Progressing   Description: Short Term Goals:  Pt will demonstrate orientation to person, place, time with 60% accuracy.  Pt will  recall 3 details with 60% accuracy.   Pt will demonstrate ID safe/unsafe situations with 60% accuracy.   Pt will demonstrate problem solving and reasoning skills with 60% accuracy.     Long Term Goals:  Pt will demonstrate orientation to person, place, time with 80% accuracy.  Pt will recall 3 details with 80% accuracy.   Pt will demonstrate ID safe/unsafe situations with 80% accuracy.   Pt will demonstrate problem solving and reasoning skills with 80% accuracy.                        Plan:     Patient to be seen:  5 x/week   Plan of Care expires:  05/24/24  Plan of Care reviewed with:  patient   SLP Follow-Up:  Yes       Discharge recommendations:  Moderate Intensity Therapy   Barriers to Discharge:  Level of Skilled Assistance Needed currently, decline in cognition and ADLs    Time Tracking:     SLP Treatment Date:   05/07/24  Speech Start Time:  0850  Speech Stop Time:  0930     Speech Total Time (min):  40 min    Billable Minutes: Speech Therapy Individual 40    05/07/2024

## 2024-05-07 NOTE — PT/OT/SLP PROGRESS
Physical Therapy Treatment    Patient Name:  Jeanie Lucas   MRN:  [id number]    Recommendations:     Discharge Recommendations: Low Intensity Therapy  Discharge Equipment Recommendations: rollator  Barriers to discharge: Decreased caregiver support    Assessment:     Jeanie Lucas is a 78 y.o. female admitted with a medical diagnosis of Weakness.  She presents with the following impairments/functional limitations: weakness, impaired endurance, impaired cognition .    Pt progressed to 250 ft today CGA with rollator, no rest breaks required.     Rehab Prognosis: Good; patient would benefit from acute skilled PT services to address these deficits and reach maximum level of function.    Recent Surgery: * No surgery found *      Plan:     During this hospitalization, patient to be seen 5 x/week to address the identified rehab impairments via gait training, therapeutic exercises and progress toward the following goals:    Plan of Care Expires:       Subjective     Chief Complaint: no pain stated  Patient/Family Comments/goals: increased BLE strengthening and endurance  Pain/Comfort:  Pain Rating 1: 0/10  Pain Rating Post-Intervention 2: 0/10      Objective:     Communicated with pt prior to session.  Patient found up in chair with   upon PT entry to room.     General Precautions: Standard, fall  Orthopedic Precautions: N/A  Braces: N/A  Respiratory Status: Room air     Functional Mobility:  Transfers:     Sit to Stand:  contact guard assistance with pt rollator  Gait: Pt ambulated 250 ft SBA with pt rollator with no rest breaks required.       AM-PAC 6 CLICK MOBILITY          Treatment & Education:  Pt performed 3x10 of the following with 3#  LAQs  Marches  HS curls with TB  Pt completed x 10 min seated NuStep to promote BLE strength and endurance.  Patient left up in chair with GARBER present.     GOALS:   Multidisciplinary Problems       Physical Therapy Goals       Not on file                    Time Tracking:     PT  Received On: 05/07/24  PT Start Time: 0930     PT Stop Time: 1010  PT Total Time (min): 40 min     Billable Minutes: Gait Training 15 and Therapeutic Exercise 25    Treatment Type: Treatment  PT/PTA: PTA     Number of PTA visits since last PT visit: 2     05/07/2024

## 2024-05-07 NOTE — CONSULTS
[facility] - Medical Surgical Unit  Adult Nutrition  Consult Note         Reason for Assessment  Reason For Assessment: consult swb admit  Nutrition Risk Screen: no indicators present    Assessment and Plan    5/7/2024 RD Follow up: Patient continues on an 1800 calorie consistent carb diet with Boost GC BID. PO intake is good with average of 75% of meals per flowsheets. Current weight 117 #. Diet adequate to meet estimated energy needs. Will f/u with patient today and make changes as needed. Patient denies issues with current diet at RD visit.  Continue current POC. RD Following.     Consult received and appreciated. Patient admitted on 5/2 with a dx of weakness, type 1 DM, dementia, heart failure and thyroid disease. Patient is ordered an 1800 calorie consistent carbohydrate diet. PO intake is good per flowsheets with % intake documented. Noted patient drinking ONS at times.     Patient is 53.4 kg with a BMI of 21.355 which is considered underweight per geriatric standards. Will provide additional calories with Boost Glucose Control BID. Encourage po intakes. RD will follow up with visit as appropriate on return to facility.       Learning Needs/Social Determinants of Health  Learning Assessment       05/02/2024 1728 [facility] (5/2/2024 - Present)   Created by Hortensia Douglass, RN - RN (Nurse) Status: Complete                 PRIMARY LEARNER     Primary Learner Name:  latanya murphy CR - 05/02/2024 1728    Relationship:  Patient CR - 05/02/2024 1728    Does the primary learner have any barriers to learning?:  No Barriers CR - 05/02/2024 1728    What is the preferred language of the primary learner?:  English CR - 05/02/2024 1728    Is an  required?:  No CR - 05/02/2024 1728    How does the primary learner prefer to learn new concepts?:  Listening CR - 05/02/2024 1728    How often do you need to have someone help you read instructions, pamphlets, or  written material from your doctor or pharmacy?:  Rarely CR - 05/02/2024 1728        CO-LEARNER #1     No question answered        CO-LEARNER #2     No question answered        SPECIAL TOPICS     No question answered        ANSWERED BY:     No question answered        Comments         Edit History       Hortensia Douglass, RN - RN (Nurse)   05/02/2024 1728                          Social Determinants of Health     Tobacco Use: Low Risk  (9/13/2023)    Patient History     Smoking Tobacco Use: Never     Smokeless Tobacco Use: Never     Passive Exposure: Not on file   Alcohol Use: Not At Risk (5/3/2024)    AUDIT-C     Frequency of Alcohol Consumption: Never     Average Number of Drinks: Patient does not drink     Frequency of Binge Drinking: Never   Financial Resource Strain: Low Risk  (5/3/2024)    Overall Financial Resource Strain (CARDIA)     Difficulty of Paying Living Expenses: Not hard at all   Food Insecurity: No Food Insecurity (5/3/2024)    Hunger Vital Sign     Worried About Running Out of Food in the Last Year: Never true     Ran Out of Food in the Last Year: Never true   Transportation Needs: No Transportation Needs (5/3/2024)    PRAPARE - Transportation     Lack of Transportation (Medical): No     Lack of Transportation (Non-Medical): No   Physical Activity: Inactive (5/3/2024)    Exercise Vital Sign     Days of Exercise per Week: 0 days     Minutes of Exercise per Session: 0 min   Stress: No Stress Concern Present (5/3/2024)    [place] [employer] - Occupational Stress Questionnaire     Feeling of Stress : Not at all   Housing Stability: Not on file (5/3/2024)   Depression: Not on file   Utilities: Not on file   Health Literacy: Not on file   Social Isolation: Not on file            Malnutrition  Is Patient Malnourished: No    Nutrition Diagnosis  Altered nutrition related laboratory values related to Diabetes Mellitus as evidenced by blood glucose levels elevated  Comments: on  CCD    Recent Labs   Lab 05/07/24  0523   POCGLU 148*     Comments on Glucose: not WNL; hx type 1 DM with hypoglycemia; noted insulin ordered once daily    Nutrition Prescription / Recommendations  Recommendation/Intervention: Continue current diet as ordered. Will order Boost Glucose Control (sub Glucerna) BID  Goals: continued good po intake of at least 75% during admission; weight maintenance during admission  Nutrition Goal Status: progressing towards goal  Current Diet Order: 1800 calorie consistent carbohydrate diet  Oral Nutrition Supplement: Boost GC BID regular diet rec per SLP  Chewing or Swallowing Difficulty?: No Chewing or swallowing difficulty  Recommended Diet: Consistent Carbohydrate 1800 (60g Carbs)  Recommended Oral Supplement: Boost Glucose Control [190kcals, 16g protein, 16g Carbs] 2 times a day  Is Nutrition Support Recommended: [facility] Nutrition Support: No  Is Nutrition Education Recommended: No    Monitor and Evaluation  % current Intake: P.O. intake of 75 - 100 %  % intake to meet estimated needs: 75 - 100 %  Food and Nutrient Intake: energy intake, food and beverage intake  Food and Nutrient Adminstration: diet order  Anthropometric Measurements: height/length, weight change, weight, body mass index  Biochemical Data, Medical Tests and Procedures: electrolyte and renal panel, gastrointestinal profile, glucose/endocrine profile, inflammatory profile  Energy Calories Required: meeting needs  Protein Required: meeting needs  Fluid Required: meeting needs  Tolerance: tolerating    Current Medical Diagnosis and Past Medical History     Past Medical History:   Diagnosis Date    Diabetes mellitus, type 2     Heart disease     Hypertension     Parkinson disease        Nutrition/Diet History  Spiritual, Cultural Beliefs, [place] Practices, Values that Affect Care: no  Food Allergies: NKFA  Factors Affecting Nutritional Intake: None identified at this time    Lab/Procedures/Meds  No results  "for input(s): "NA", "K", "BUN", "CREATININE", "CALCIUM", "ALBUMIN", "CL", "ALT", "AST", "PHOS" in the last 72 hours.  Bun/Cr elevated  Last A1c: elevated  Lab Results   Component Value Date    HGBA1C 7.8 (H) 05/02/2024     Lab Results   Component Value Date    RBC 3.34 (L) 05/02/2024    HGB 9.8 (L) 05/02/2024    HCT 33.0 (L) 05/02/2024    MCV 98.8 (H) 05/02/2024    MCH 29.3 05/02/2024    MCHC 29.7 (L) 05/02/2024     Pertinent Labs Reviewed: reviewed  Pertinent Labs Comments: Glucose 186-384  Pertinent Medications Reviewed: reviewed  Scheduled Meds:   aspirin  81 mg Oral Daily    atorvastatin  80 mg Oral QHS    carvediloL  12.5 mg Oral BID    insulin detemir U-100  10 Units Subcutaneous Daily    isosorbide mononitrate  30 mg Oral Daily    latanoprost  1 drop Left Eye QHS    levothyroxine  25 mcg Oral Before breakfast    pantoprazole  40 mg Oral Daily    sertraline  25 mg Oral Daily    spironolactone  25 mg Oral Daily    sulfamethoxazole-trimethoprim 800-160mg  1 tablet Oral Daily     Continuous Infusions:      PRN Meds:.  Current Facility-Administered Medications:     acetaminophen, 650 mg, Oral, Q6H PRN    calcium carbonate, 500 mg, Oral, BID PRN    dextrose 10%, 12.5 g, Intravenous, PRN    dextrose 10%, 12.5 g, Intravenous, PRN    dextrose 10%, 25 g, Intravenous, PRN    dextrose 10%, 25 g, Intravenous, PRN    furosemide, 20 mg, Oral, Daily PRN    glucagon (human recombinant), 1 mg, Intramuscular, PRN    glucose, 16 g, Oral, PRN    glucose, 24 g, Oral, PRN    insulin aspart U-100, 0-10 Units, Subcutaneous, QID (AC + HS) PRN    melatonin, 6 mg, Oral, Nightly PRN    senna-docusate 8.6-50 mg, 1 tablet, Oral, BID PRN    Anthropometrics  Temp: 98.5 °F (36.9 °C)  Height: 5' 2" (157.5 cm)  Height (inches): 62 in  Weight Method: Bed Scale  Weight: 53.4 kg (117 lb 12.8 oz)  Weight (lb): 117.8 lb  Ideal Body Weight (IBW), Female: 110 lb  % Ideal Body Weight, Female (lb): 107.09 %  BMI (Calculated): 21.5   "     Estimated/Assessed Needs      Temp: 98.5 °F (36.9 °C)Oral  Weight Used For Calorie Calculations: 53.4 kg (117 lb 11.6 oz)   Energy Need Method: Kcal/kg Energy Calorie Requirements (kcal): 8334-6601  Weight Used For Protein Calculations: 53.4 kg (117 lb 11.6 oz)  Protein Requirements: 43-54       RDA Method (mL): 1335       Nutrition by Nursing  Diet/Nutrition Received: consistent carb/diabetic diet  Intake (%): 100%  Diet/Feeding Assistance: tray set-up  Diet/Feeding Tolerance: good  Last Bowel Movement: 05/06/24                Nutrition Follow-Up  RD Follow-up?: Yes      Nutrition Discharge Planning: Home with home health; patient would benefit from a liberalized diet on d/c with ONS             Available via Secure Chat

## 2024-05-07 NOTE — PLAN OF CARE
Care plan reviewed  Problem: Adult Inpatient Plan of Care  Goal: Plan of Care Review  Outcome: Progressing     Problem: Adult Inpatient Plan of Care  Goal: Absence of Hospital-Acquired Illness or Injury  Outcome: Progressing     Problem: Adult Inpatient Plan of Care  Goal: Optimal Comfort and Wellbeing  Outcome: Progressing     Problem: Fall Injury Risk  Goal: Absence of Fall and Fall-Related Injury  Outcome: Progressing     Problem: Diabetes Comorbidity  Goal: Blood Glucose Level Within Targeted Range  Outcome: Progressing     Problem: Adult Inpatient Plan of Care  Goal: Readiness for Transition of Care  Outcome: Progressing

## 2024-05-07 NOTE — PT/OT/SLP PROGRESS
Occupational Therapy   Treatment    Name: Jeanie Lucas  MRN: [id number]  Admitting Diagnosis:  Weakness       Recommendations:     Discharge Recommendations: Low Intensity Therapy  Discharge Equipment Recommendations:  rollator  Barriers to discharge:       Assessment:     Jeanie Lucas is a 78 y.o. female with a medical diagnosis of Weakness.  She presents with the following Performance deficits affecting function are weakness, decreased upper extremity function, impaired endurance, impaired balance, decreased safety awareness.     Rehab Prognosis:  Good; patient would benefit from acute skilled OT services to address these deficits and reach maximum level of function.       Plan:     Patient to be seen 5 x/week to address the above listed problems via self-care/home management, therapeutic activities, therapeutic exercises  Plan of Care Expires: 05/17/24  Plan of Care Reviewed with: patient    Subjective   Patient was sitting up in her recliner chair in therapy gym after PT session upon GARBER arrival. Patient stated she was feeling fine with no complaints of pain and was agreeable to participate in todays session.   Patient/Family Comments/goals: to increase UB strength and endurance in order to return home.   Pain/Comfort:  Pain Rating 1: 0/10    Objective:     Communicated with: nursing staff prior to session.  Patient found up in chair with   upon OT entry to room.    General Precautions: Standard, fall    Orthopedic Precautions:N/A  Braces: N/A  Respiratory Status: Room air    Therapeutic exercises:  Patient completed the following exercises to work on UB strength in order to complete ADLs, bed mobility, and transfers with less assistance.    -Bicep curls: 3 sets of 10 with 1# dumbbell  -Chest press: 3 sets of 10 with 1# dumbbell  -Shoulder flex: 3 sets of 10 with 1# dumbbell  -Internal/External rotation: 3 sets of 10 with 1# dumbbell    Increased time/effort needed to complete each exercise.     Therapeutic  activities:  Patient completed 10 minutes on UBE ergometer to work on UB strength and endurance in order to complete ADLs and transfers w/ less A.   While standing, pt engaged in standing tolerance activity to increase overall tolerance and static/dynamic stand balance in order to complete ADLs standing at sink with less assistance. Pt stood 5:32 minutes with Contact Guard Assistance before requiring a rest break.   Pt completed 8 sit to stand transfers with contact guard and minimal assist to work on tricep strength, stand tolerance and endurance in preparation to complete ADLs and transfers from different surfaces such as BSC, chair, etc. with less assistance. Increased time/effort needed.     Patient left up in chair with call button in reach and RN notified    GOALS:   Multidisciplinary Problems       Occupational Therapy Goals          Problem: Occupational Therapy    Goal Priority Disciplines Outcome Interventions   Occupational Therapy Goal     OT, PT/OT Progressing    Description: STG:  Pt will perform grooming with I  Pt will bathe self with SVN  Pt will perform UB dressing with SVN  Pt will perform LB dressing with SVN  Pt will transfer to toilet with SBA  Pt will perform standing task x 3 min with SBA    LTG:  Pt will bathe self with I  Pt will perform UB dressing with I  Pt will perform LB dressing with I  Pt will transfer to toilet with I  Pt will perform standing task x 5 min with SBA                        Time Tracking:     OT Date of Treatment: 05/07/24  OT Start Time: 1010  OT Stop Time: 1048  OT Total Time (min): 38 min    Billable Minutes:Therapeutic Activity 23 minutes  Therapeutic Exercise 15 minutes    OT/SUSANNA: SUSANNA     Number of SUSANNA visits since last OT visit: 1    SUSANNA Galvan  5/7/2024  2:04 PM

## 2024-05-07 NOTE — PLAN OF CARE
Problem: Adult Inpatient Plan of Care  Goal: Plan of Care Review  Outcome: Progressing     Problem: Diabetes Comorbidity  Goal: Blood Glucose Level Within Targeted Range  Outcome: Progressing     Problem: Fall Injury Risk  Goal: Absence of Fall and Fall-Related Injury  Outcome: Progressing

## 2024-05-08 LAB
GLUCOSE SERPL-MCNC: 113 MG/DL (ref 70–105)
GLUCOSE SERPL-MCNC: 148 MG/DL (ref 70–105)
GLUCOSE SERPL-MCNC: 195 MG/DL (ref 70–105)
GLUCOSE SERPL-MCNC: 66 MG/DL (ref 70–105)
GLUCOSE SERPL-MCNC: 80 MG/DL (ref 70–105)

## 2024-05-08 PROCEDURE — 97110 THERAPEUTIC EXERCISES: CPT

## 2024-05-08 PROCEDURE — 25000003 PHARM REV CODE 250

## 2024-05-08 PROCEDURE — 97530 THERAPEUTIC ACTIVITIES: CPT | Mod: CO

## 2024-05-08 PROCEDURE — 82962 GLUCOSE BLOOD TEST: CPT

## 2024-05-08 PROCEDURE — 11000004 HC SNF PRIVATE

## 2024-05-08 PROCEDURE — 63600175 PHARM REV CODE 636 W HCPCS: Performed by: FAMILY MEDICINE

## 2024-05-08 PROCEDURE — 92507 TX SP LANG VOICE COMM INDIV: CPT

## 2024-05-08 PROCEDURE — 97535 SELF CARE MNGMENT TRAINING: CPT | Mod: CO

## 2024-05-08 RX ADMIN — INSULIN GLARGINE 10 UNITS: 100 INJECTION, SOLUTION SUBCUTANEOUS at 09:05

## 2024-05-08 RX ADMIN — SPIRONOLACTONE 25 MG: 25 TABLET, FILM COATED ORAL at 09:05

## 2024-05-08 RX ADMIN — ISOSORBIDE MONONITRATE 30 MG: 30 TABLET, EXTENDED RELEASE ORAL at 09:05

## 2024-05-08 RX ADMIN — ASPIRIN 81 MG: 81 TABLET, COATED ORAL at 09:05

## 2024-05-08 RX ADMIN — LATANOPROST 1 DROP: 50 SOLUTION OPHTHALMIC at 10:05

## 2024-05-08 RX ADMIN — PANTOPRAZOLE SODIUM 40 MG: 40 TABLET, DELAYED RELEASE ORAL at 09:05

## 2024-05-08 RX ADMIN — ATORVASTATIN CALCIUM 80 MG: 40 TABLET, FILM COATED ORAL at 09:05

## 2024-05-08 RX ADMIN — CARVEDILOL 12.5 MG: 6.25 TABLET, FILM COATED ORAL at 09:05

## 2024-05-08 RX ADMIN — LEVOTHYROXINE SODIUM 25 MCG: 25 TABLET ORAL at 05:05

## 2024-05-08 RX ADMIN — SERTRALINE HYDROCHLORIDE 25 MG: 25 TABLET ORAL at 09:05

## 2024-05-08 NOTE — PT/OT/SLP PROGRESS
Occupational Therapy   Treatment    Name: Jeanie Lucas  MRN: [id number]  Admitting Diagnosis:  Weakness       Recommendations:     Discharge Recommendations: Low Intensity Therapy  Discharge Equipment Recommendations:  rollator  Barriers to discharge:       Assessment:     Jeanie Lucas is a 78 y.o. female with a medical diagnosis of Weakness.  She presents with the following Performance deficits affecting function are weakness, impaired endurance, impaired balance, decreased safety awareness, impaired cognition.     Rehab Prognosis:  Good; patient would benefit from acute skilled OT services to address these deficits and reach maximum level of function.       Plan:     Patient to be seen 5 x/week to address the above listed problems via self-care/home management, therapeutic activities, therapeutic exercises  Plan of Care Expires: 05/17/24  Plan of Care Reviewed with: patient    Subjective   Patient was laying supine in bed upon GARBER arrival. Patient stated she was feeling fine with no complaints of pain and was agreeable to participate in todays session with MOD encouragement.   Patient/Family Comments/goals: to increase UB strength and endurance in order to return home.   Pain/Comfort:  Pain Rating 1: 0/10    Objective:     Communicated with: nursing staff prior to session.  Patient found HOB elevated with   upon OT entry to room.    General Precautions: Standard, fall    Orthopedic Precautions:N/A  Braces: N/A  Respiratory Status: Room air     Occupational Performance:     Bed Mobility:    Patient completed Rolling/Turning to Left with  modified independence  Patient completed Rolling/Turning to Right with modified independence  Patient completed Scooting/Bridging with modified independence  Patient completed Supine to Sit with modified independence     Functional Mobility/Transfers:  Patient completed Sit <> Stand Transfer with supervision  with  4 wheeled walker   Patient completed Bed <> Chair Transfer using  Step Transfer technique with supervision with 4 wheeled walker    Activities of Daily Living:  Upper Body Dressing: modified independence While sitting EOB, patient completed donning house coat with MOD I. Increased time/effort needed.   Lower Body Dressing: modified independence While sitting EOB, patient completed donning socks and shoes with MOD I. Increased time/effort needed.     Therapeutic activities:  Patient completed 10 minutes on UBE ergometer to work on UB strength and endurance in order to complete ADLs and transfers w/ less A.   Pt completed 8 sit to stand transfers with supervision to work on tricep strength, stand tolerance and endurance in preparation to complete ADLs and transfers from different surfaces such as BSC, chair, etc. with less assistance. Increased time/effort needed.   While standing, pt instructed in several balance activities requiring pt to reach in all planes and moving in/out of midline to work on dynamic standing balance and endurance in order to complete ADLs standing at sink and being able to reach in cabinets with less assistance. Pt stood 4:28 minutes with Stand-by Assistance.      Patient left up in chair with  PT present    GOALS:   Multidisciplinary Problems       Occupational Therapy Goals          Problem: Occupational Therapy    Goal Priority Disciplines Outcome Interventions   Occupational Therapy Goal     OT, PT/OT Progressing    Description: STG:  Pt will perform grooming with I  Pt will bathe self with SVN  Pt will perform UB dressing with SVN  Pt will perform LB dressing with SVN  Pt will transfer to toilet with SBA  Pt will perform standing task x 3 min with SBA    LTG:  Pt will bathe self with I  Pt will perform UB dressing with I  Pt will perform LB dressing with I  Pt will transfer to toilet with I  Pt will perform standing task x 5 min with SBA                        Time Tracking:     OT Date of Treatment: 05/08/24  OT Start Time: 0908  OT Stop Time: 0938  OT  Total Time (min): 30 min    Billable Minutes:Self Care/Home Management 12 minutes  Therapeutic Activity 18 minutes    OT/SUSANNA: SUSANNA     Number of SUSANNA visits since last OT visit: 2    SUSANNA Galvan  5/8/2024  11:49 AM

## 2024-05-08 NOTE — PLAN OF CARE
Problem: Adult Inpatient Plan of Care  Goal: Plan of Care Review  Outcome: Progressing  Goal: Patient-Specific Goal (Individualized)  Outcome: Progressing  Goal: Absence of Hospital-Acquired Illness or Injury  Outcome: Progressing  Goal: Optimal Comfort and Wellbeing  Outcome: Progressing  Goal: Readiness for Transition of Care  Outcome: Progressing     Problem: Skin Injury Risk Increased  Goal: Skin Health and Integrity  Outcome: Progressing     Problem: Diabetes Comorbidity  Goal: Blood Glucose Level Within Targeted Range  Outcome: Progressing     Problem: Fall Injury Risk  Goal: Absence of Fall and Fall-Related Injury  Outcome: Progressing

## 2024-05-08 NOTE — CARE UPDATE
[facility] - [facility] Unit - Swing Bed   Interdisciplinary Team Meeting    Patient: Jeanie Lucas   Today's Date: 5/8/2024   Estimated D/C Date: 5/24/2024        Physician:  Dr Calzada Nurse Practitioner:  Pete Lilly   Pharmacy:  [facility] Unit Director: Phuong Nolasco   : Debbi Cross Physical/Occupational Therapy: Solomon Cadena   Speech Therapy:  Justine Dewitt Activity Therapy: Debbi Vaz   Nursing: Phuong Nolasco  Respiratory: Beatriz Saeed Dietary:  Christine   Other: ns     Nurse  New Symptoms/Problems: na  Last Bowel Movement: 05/08/24   Urine: continent  [place]: No  Bowel: continent   Constipated: No  Diarrhea: No   Isolation: No  Wound Care: No  Wound Location/Tx: na  Cognition:  forgetful declining cognition   Aspiration Precautions: No  Comment(s): na    Respiratory   O2 Device: Room Air  O2 Flow: 0  SpO2: 97%  Neb Tx: No  Comment(s): na     Dietary  Nutrition: Diabetic  Comment(s): 1800 ADA 75% INTAKE     Speech Therapy  Speech/Swallowing: No current speech or swallowing issues  Comment(s): ST seeing pt for declining cognition     Physical Therapy  Gait/Assistive Device: 250+ ft Rolator  ELOS: Plan to DC 5/24/2024    Transfers: Minimal Assistance  Bed Mobility: Independent Range of Motion/Restrictions: na  Comment(s): na     Occupational Therapy  Eating/Grooming: Independent Toileting: Minimal Assistance   Bathing: Minimal Assistance Dressing (Upper Body): Independent   Dressing (Lower Body): Independent Comment(s): na     Pharmacy  Medication Changes (see all MD orders in chart): Yes  Labs Reviewed: Yes  New Lab Orders: No  Comment(s): Levaquin 750  for one dose then 500 mg q 48 hours       Tx Plan/Recommendations reviewed with family and/or patient on (date) 5/8/24.  Additional family Conference/Training: family conference with Caridad and her  for increased  pt supervision at home   D/C Plan/Recommendations: Home with HH and Home with family  JENNIE:  5/24/2024  Comment(s): home with family awareness of needed increased supervision and [facility]

## 2024-05-08 NOTE — PT/OT/SLP PROGRESS
Speech Language Pathology Treatment    Patient Name:  Jeanie Lucas   MRN:  [id number]  Admitting Diagnosis: Weakness    Recommendations:                 General Recommendations:  Cognitive-linguistic therapy  Diet recommendations:  Regular, Liquid Diet Level: Thin   Aspiration Precautions: Standard aspiration precautions   General Precautions: Standard, fall  Communication strategies:  none    Assessment:     Jeanie Lucas is a 78 y.o. female with an SLP diagnosis of Cognitive-Linguistic Impairment.  She presents with hospital admission and following SWB admission secondary to frequent falls, altered mental status due to metabolic encephalopathy.     Pt presents with change in cognitive status, increased falls, decreased safety awareness, decline in independence with ADLs       Subjective     Pt seen in treatment room post PT session  Patient goals: ready to get back home      Pain/Comfort:       Respiratory Status: Room air    Objective:     Has the patient been evaluated by SLP for swallowing?   Yes  Keep patient NPO? No   Current Respiratory Status:        Tasks completed this date for verbal orientation. Pt oriented to person, place, situation, MEHDI.   Tasks completed for increased attention, language processing skills. Pt with ability to maintain adequate attention across 10-12 minutes with no cues, pt required increased cues for task continuation with accuracy remainder of session.     Reasoning skills for numerics, sorting according to similarities and differences completed with 80% accuracy given model.     Goals:   Multidisciplinary Problems       SLP Goals          Problem: SLP    Goal Priority Disciplines Outcome   SLP Goal     SLP Progressing   Description: Short Term Goals:  Pt will demonstrate orientation to person, place, time with 60% accuracy.  Pt will recall 3 details with 60% accuracy.   Pt will demonstrate ID safe/unsafe situations with 60% accuracy.   Pt will demonstrate problem solving and  reasoning skills with 60% accuracy.     Long Term Goals:  Pt will demonstrate orientation to person, place, time with 80% accuracy.  Pt will recall 3 details with 80% accuracy.   Pt will demonstrate ID safe/unsafe situations with 80% accuracy.   Pt will demonstrate problem solving and reasoning skills with 80% accuracy.                        Plan:     Patient to be seen:  5 x/week   Plan of Care expires:  05/24/24  Plan of Care reviewed with:  patient   SLP Follow-Up:  Yes       Discharge recommendations:  Moderate Intensity Therapy   Barriers to Discharge:  Level of Skilled Assistance Needed currently, decline in cognition and ADLs    Time Tracking:     SLP Treatment Date:   05/08/24  Speech Start Time:  1020  Speech Stop Time:  1050     Speech Total Time (min):  30 min    Billable Minutes: Speech Therapy Individual 30    05/08/2024

## 2024-05-08 NOTE — PLAN OF CARE
Problem: Adult Inpatient Plan of Care  Goal: Plan of Care Review  Outcome: Progressing     Problem: Adult Inpatient Plan of Care  Goal: Patient-Specific Goal (Individualized)  Outcome: Progressing     Problem: Adult Inpatient Plan of Care  Goal: Absence of Hospital-Acquired Illness or Injury  Outcome: Progressing     Problem: Adult Inpatient Plan of Care  Goal: Optimal Comfort and Wellbeing  Outcome: Progressing     Problem: Adult Inpatient Plan of Care  Goal: Readiness for Transition of Care  Outcome: Progressing     Problem: Skin Injury Risk Increased  Goal: Skin Health and Integrity  Outcome: Progressing     Problem: Diabetes Comorbidity  Goal: Blood Glucose Level Within Targeted Range  Outcome: Progressing     Problem: Fall Injury Risk  Goal: Absence of Fall and Fall-Related Injury  Outcome: Progressing

## 2024-05-08 NOTE — PLAN OF CARE
[facility] - Medical Surgical Unit  Discharge Final Note    Primary Care Provider: Arabella Ann FNP    Expected Discharge Date: 5/24/2024    Final Discharge Note (most recent)       Final Note - 05/08/24 1417          Final Note    Assessment Type Final Discharge Note     Anticipated Discharge Disposition Home-Health Care [facility]     What phone number can be called within the next 1-3 days to see how you are doing after discharge? [phone]     Hospital Resources/Appts/Education Provided Community resources provided        Post-Acute Status    Post-Acute Authorization Home Health     Home Health Status Referrals Sent     Coverage mcare     Patient choice form signed by patient/caregiver List with quality metrics by geographic area provided     Discharge Delays None known at this time                     Important Message from Medicare  Important Message from Medicare regarding Discharge Appeal Rights: Given to patient/caregiver, Explained to patient/caregiver, Signed/date by patient/caregiver     Date IMM was signed: 05/08/24  Time IMM was signed: 1030    family case conference with Caridad and her  , pt will need increased supervision and assistance at home ,, She will be referred to [facility] per her choice and I verified [facility] received referral and will admit on Saturday . I also recommended IOP for pt and gave Caridad pamphlet sent referral  to IOP

## 2024-05-08 NOTE — PT/OT/SLP PROGRESS
Physical Therapy Treatment    Patient Name:  Jeanie Lucas   MRN:  [id number]    Recommendations:     Discharge Recommendations: Low Intensity Therapy  Discharge Equipment Recommendations: rollator  Barriers to discharge: Decreased caregiver support    Assessment:     Jeanie Lucas is a 78 y.o. female admitted with a medical diagnosis of Weakness.  She presents with the following impairments/functional limitations: weakness, impaired endurance, impaired cognition .      Rehab Prognosis: Good; patient would benefit from acute skilled PT services to address these deficits and reach maximum level of function.    Recent Surgery: * No surgery found *      Plan:     During this hospitalization, patient to be seen 5 x/week to address the identified rehab impairments via gait training, therapeutic exercises and progress toward the following goals:    Plan of Care Expires:       Subjective     Chief Complaint: no pain stated  Patient/Family Comments/goals: increased BLE strengthening and endurance  Pain/Comfort:         Objective:     Communicated with pt prior to session.  Patient found up in chair with   upon PT entry to room.     General Precautions: Standard, fall  Orthopedic Precautions: N/A  Braces: N/A  Respiratory Status: Room air     Functional Mobility:  Transfers:     Sit to Stand:  contact guard assistance with pt rollator  Gait: Pt ambulated 250 ft SBA with pt rollator with no rest breaks required.       AM-PAC 6 CLICK MOBILITY          Treatment & Education:  Pt performed 3x10 of the following with 3#  LAQs  Marches  HS curls with TB  Hip adduction  Hip abduction    Patient left up in chair with SLP present.     GOALS:   Multidisciplinary Problems       Physical Therapy Goals       Not on file                    Time Tracking:     PT Received On: 05/08/24  PT Start Time: 0940     PT Stop Time: 1015  PT Total Time (min): 35 min       05/08/2024

## 2024-05-09 PROBLEM — E87.5 HYPERKALEMIA: Status: ACTIVE | Noted: 2024-05-09

## 2024-05-09 LAB
ANION GAP SERPL CALCULATED.3IONS-SCNC: 15 MMOL/L (ref 7–16)
ANION GAP SERPL CALCULATED.3IONS-SCNC: 19 MMOL/L (ref 7–16)
ANION GAP SERPL CALCULATED.3IONS-SCNC: 20 MMOL/L (ref 7–16)
BASOPHILS # BLD AUTO: 0.04 K/UL (ref 0–0.2)
BASOPHILS NFR BLD AUTO: 1 % (ref 0–1)
BILIRUB UR QL STRIP: NEGATIVE
BUN SERPL-MCNC: 85 MG/DL (ref 7–18)
BUN SERPL-MCNC: 85 MG/DL (ref 7–18)
BUN SERPL-MCNC: 90 MG/DL (ref 7–18)
BUN/CREAT SERPL: 21 (ref 6–20)
BUN/CREAT SERPL: 21 (ref 6–20)
BUN/CREAT SERPL: 23 (ref 6–20)
CALCIUM SERPL-MCNC: 8.6 MG/DL (ref 8.5–10.1)
CALCIUM SERPL-MCNC: 8.7 MG/DL (ref 8.5–10.1)
CALCIUM SERPL-MCNC: 9.2 MG/DL (ref 8.5–10.1)
CHLORIDE SERPL-SCNC: 109 MMOL/L (ref 98–107)
CHLORIDE SERPL-SCNC: 110 MMOL/L (ref 98–107)
CHLORIDE SERPL-SCNC: 111 MMOL/L (ref 98–107)
CLARITY UR: CLEAR
CO2 SERPL-SCNC: 14 MMOL/L (ref 21–32)
CO2 SERPL-SCNC: 16 MMOL/L (ref 21–32)
CO2 SERPL-SCNC: 21 MMOL/L (ref 21–32)
COLOR UR: YELLOW
CREAT SERPL-MCNC: 3.95 MG/DL (ref 0.55–1.02)
CREAT SERPL-MCNC: 4 MG/DL (ref 0.55–1.02)
CREAT SERPL-MCNC: 4.05 MG/DL (ref 0.55–1.02)
DIFFERENTIAL METHOD BLD: ABNORMAL
EGFR (NO RACE VARIABLE) (RUSH/TITUS): 11 ML/MIN/1.73M2
EOSINOPHIL # BLD AUTO: 0.06 K/UL (ref 0–0.5)
EOSINOPHIL NFR BLD AUTO: 1.4 % (ref 1–4)
ERYTHROCYTE [DISTWIDTH] IN BLOOD BY AUTOMATED COUNT: 15.5 % (ref 11.5–14.5)
GLUCOSE SERPL-MCNC: 127 MG/DL (ref 70–105)
GLUCOSE SERPL-MCNC: 134 MG/DL (ref 70–105)
GLUCOSE SERPL-MCNC: 148 MG/DL (ref 70–105)
GLUCOSE SERPL-MCNC: 177 MG/DL (ref 70–105)
GLUCOSE SERPL-MCNC: 178 MG/DL (ref 74–106)
GLUCOSE SERPL-MCNC: 192 MG/DL (ref 74–106)
GLUCOSE SERPL-MCNC: 209 MG/DL (ref 70–105)
GLUCOSE SERPL-MCNC: 290 MG/DL (ref 74–106)
GLUCOSE SERPL-MCNC: 364 MG/DL (ref 70–105)
GLUCOSE UR STRIP-MCNC: NEGATIVE MG/DL
HCT VFR BLD AUTO: 27.4 % (ref 38–47)
HGB BLD-MCNC: 8.7 G/DL (ref 12–16)
KETONES UR STRIP-SCNC: NEGATIVE MG/DL
LEUKOCYTE ESTERASE UR QL STRIP: NEGATIVE
LYMPHOCYTES # BLD AUTO: 0.76 K/UL (ref 1–4.8)
LYMPHOCYTES NFR BLD AUTO: 18.3 % (ref 27–41)
MCH RBC QN AUTO: 29.6 PG (ref 27–31)
MCHC RBC AUTO-ENTMCNC: 31.8 G/DL (ref 32–36)
MCV RBC AUTO: 93.2 FL (ref 80–96)
MONOCYTES # BLD AUTO: 0.52 K/UL (ref 0–0.8)
MONOCYTES NFR BLD AUTO: 12.5 % (ref 2–6)
MPC BLD CALC-MCNC: 9.2 FL (ref 9.4–12.4)
NEUTROPHILS # BLD AUTO: 2.78 K/UL (ref 1.8–7.7)
NEUTROPHILS NFR BLD AUTO: 66.8 % (ref 53–65)
NITRITE UR QL STRIP: NEGATIVE
PH UR STRIP: 6 PH UNITS
PLATELET # BLD AUTO: 165 K/UL (ref 150–400)
POTASSIUM SERPL-SCNC: 6.1 MMOL/L (ref 3.5–5.1)
POTASSIUM SERPL-SCNC: 6.5 MMOL/L (ref 3.5–5.1)
POTASSIUM SERPL-SCNC: 7.1 MMOL/L (ref 3.5–5.1)
PROT UR QL STRIP: ABNORMAL
RBC # BLD AUTO: 2.94 M/UL (ref 4.2–5.4)
RBC # UR STRIP: NEGATIVE /UL
SODIUM SERPL-SCNC: 137 MMOL/L (ref 136–145)
SODIUM SERPL-SCNC: 137 MMOL/L (ref 136–145)
SODIUM SERPL-SCNC: 141 MMOL/L (ref 136–145)
SP GR UR STRIP: 1.01
UROBILINOGEN UR STRIP-ACNC: 0.2 MG/DL
WBC # BLD AUTO: 4.16 K/UL (ref 4.5–11)

## 2024-05-09 PROCEDURE — 93010 ELECTROCARDIOGRAM REPORT: CPT | Mod: ,,, | Performed by: STUDENT IN AN ORGANIZED HEALTH CARE EDUCATION/TRAINING PROGRAM

## 2024-05-09 PROCEDURE — 63600175 PHARM REV CODE 636 W HCPCS: Performed by: FAMILY MEDICINE

## 2024-05-09 PROCEDURE — 94640 AIRWAY INHALATION TREATMENT: CPT

## 2024-05-09 PROCEDURE — 82962 GLUCOSE BLOOD TEST: CPT

## 2024-05-09 PROCEDURE — 80048 BASIC METABOLIC PNL TOTAL CA: CPT | Performed by: NURSE PRACTITIONER

## 2024-05-09 PROCEDURE — 25000003 PHARM REV CODE 250: Performed by: FAMILY MEDICINE

## 2024-05-09 PROCEDURE — 96372 THER/PROPH/DIAG INJ SC/IM: CPT

## 2024-05-09 PROCEDURE — 36415 COLL VENOUS BLD VENIPUNCTURE: CPT | Performed by: NURSE PRACTITIONER

## 2024-05-09 PROCEDURE — 92507 TX SP LANG VOICE COMM INDIV: CPT

## 2024-05-09 PROCEDURE — 93005 ELECTROCARDIOGRAM TRACING: CPT

## 2024-05-09 PROCEDURE — 81003 URINALYSIS AUTO W/O SCOPE: CPT | Performed by: NURSE PRACTITIONER

## 2024-05-09 PROCEDURE — 25000003 PHARM REV CODE 250

## 2024-05-09 PROCEDURE — 97110 THERAPEUTIC EXERCISES: CPT

## 2024-05-09 PROCEDURE — 63600175 PHARM REV CODE 636 W HCPCS: Performed by: NURSE PRACTITIONER

## 2024-05-09 PROCEDURE — 25000003 PHARM REV CODE 250: Performed by: NURSE PRACTITIONER

## 2024-05-09 PROCEDURE — 11000004 HC SNF PRIVATE

## 2024-05-09 PROCEDURE — 97116 GAIT TRAINING THERAPY: CPT

## 2024-05-09 PROCEDURE — 25000242 PHARM REV CODE 250 ALT 637 W/ HCPCS: Performed by: NURSE PRACTITIONER

## 2024-05-09 PROCEDURE — 85025 COMPLETE CBC W/AUTO DIFF WBC: CPT | Performed by: NURSE PRACTITIONER

## 2024-05-09 RX ORDER — SODIUM BICARBONATE 1 MEQ/ML
50 SYRINGE (ML) INTRAVENOUS ONCE
Qty: 50 ML | Refills: 0 | Status: COMPLETED | OUTPATIENT
Start: 2024-05-09 | End: 2024-05-09

## 2024-05-09 RX ORDER — CALCIUM GLUCONATE 20 MG/ML
1 INJECTION, SOLUTION INTRAVENOUS ONCE
Qty: 50 ML | Refills: 0 | Status: COMPLETED | OUTPATIENT
Start: 2024-05-09 | End: 2024-05-09

## 2024-05-09 RX ORDER — CALCIUM GLUCONATE 20 MG/ML
1 INJECTION, SOLUTION INTRAVENOUS ONCE
Status: COMPLETED | OUTPATIENT
Start: 2024-05-09 | End: 2024-05-09

## 2024-05-09 RX ORDER — SODIUM CHLORIDE 9 MG/ML
INJECTION, SOLUTION INTRAVENOUS CONTINUOUS
Status: DISCONTINUED | OUTPATIENT
Start: 2024-05-09 | End: 2024-05-10

## 2024-05-09 RX ORDER — ALBUTEROL SULFATE 0.83 MG/ML
2.5 SOLUTION RESPIRATORY (INHALATION) ONCE
Status: COMPLETED | OUTPATIENT
Start: 2024-05-09 | End: 2024-05-09

## 2024-05-09 RX ORDER — SODIUM BICARBONATE 1 MEQ/ML
100 SYRINGE (ML) INTRAVENOUS ONCE
Status: COMPLETED | OUTPATIENT
Start: 2024-05-09 | End: 2024-05-09

## 2024-05-09 RX ADMIN — ALBUTEROL SULFATE 2.5 MG: 2.5 SOLUTION RESPIRATORY (INHALATION) at 05:05

## 2024-05-09 RX ADMIN — CALCIUM GLUCONATE 1 G: 20 INJECTION, SOLUTION INTRAVENOUS at 09:05

## 2024-05-09 RX ADMIN — ISOSORBIDE MONONITRATE 30 MG: 30 TABLET, EXTENDED RELEASE ORAL at 09:05

## 2024-05-09 RX ADMIN — LEVOFLOXACIN 500 MG: 500 TABLET, FILM COATED ORAL at 09:05

## 2024-05-09 RX ADMIN — INSULIN GLARGINE 10 UNITS: 100 INJECTION, SOLUTION SUBCUTANEOUS at 09:05

## 2024-05-09 RX ADMIN — SODIUM CHLORIDE: 9 INJECTION, SOLUTION INTRAVENOUS at 10:05

## 2024-05-09 RX ADMIN — CARVEDILOL 12.5 MG: 6.25 TABLET, FILM COATED ORAL at 09:05

## 2024-05-09 RX ADMIN — SODIUM BICARBONATE: 84 INJECTION, SOLUTION INTRAVENOUS at 03:05

## 2024-05-09 RX ADMIN — SODIUM POLYSTYRENE SULFONATE 15 G: 15 SUSPENSION ORAL; RECTAL at 03:05

## 2024-05-09 RX ADMIN — LATANOPROST 1 DROP: 50 SOLUTION OPHTHALMIC at 09:05

## 2024-05-09 RX ADMIN — SPIRONOLACTONE 25 MG: 25 TABLET, FILM COATED ORAL at 09:05

## 2024-05-09 RX ADMIN — SERTRALINE HYDROCHLORIDE 25 MG: 25 TABLET ORAL at 09:05

## 2024-05-09 RX ADMIN — DEXTROSE MONOHYDRATE 25 G: 25 INJECTION, SOLUTION INTRAVENOUS at 03:05

## 2024-05-09 RX ADMIN — CALCIUM GLUCONATE 1 G: 20 INJECTION, SOLUTION INTRAVENOUS at 03:05

## 2024-05-09 RX ADMIN — ATORVASTATIN CALCIUM 80 MG: 40 TABLET, FILM COATED ORAL at 09:05

## 2024-05-09 RX ADMIN — PANTOPRAZOLE SODIUM 40 MG: 40 TABLET, DELAYED RELEASE ORAL at 09:05

## 2024-05-09 RX ADMIN — SODIUM POLYSTYRENE SULFONATE 15 G: 15 SUSPENSION ORAL; RECTAL at 09:05

## 2024-05-09 RX ADMIN — ASPIRIN 81 MG: 81 TABLET, COATED ORAL at 09:05

## 2024-05-09 RX ADMIN — LEVOTHYROXINE SODIUM 25 MCG: 25 TABLET ORAL at 06:05

## 2024-05-09 RX ADMIN — SODIUM BICARBONATE 50 MEQ: 84 INJECTION INTRAVENOUS at 10:05

## 2024-05-09 RX ADMIN — HUMAN INSULIN 5 UNITS: 100 INJECTION, SOLUTION SUBCUTANEOUS at 02:05

## 2024-05-09 RX ADMIN — SODIUM BICARBONATE 100 MEQ: 84 INJECTION INTRAVENOUS at 03:05

## 2024-05-09 NOTE — PLAN OF CARE
Problem: Occupational Therapy  Goal: Occupational Therapy Goal  Description: STG:  Pt will perform grooming with I  Pt will bathe self with SVN  (Goal met)  Pt will perform UB dressing with SVN   (Goal met)  Pt will perform LB dressing with SVN   (Goal met)  Pt will transfer to toilet with SBA  (Goal met)  Pt will perform standing task x 3 min with SBA  (Goal met)    LTG:  Pt will bathe self with I    (SVN - Setup)  Pt will perform UB dressing with I   (SVN - Setup)  Pt will perform LB dressing with I   (SVN - Setup)  Pt will transfer to toilet with I   (SVN - Mod I)  Pt will perform standing task x 5 min with SBA   (Partially met)  Outcome: Progressing

## 2024-05-09 NOTE — PT/OT/SLP PROGRESS
Speech Language Pathology Treatment    Patient Name:  Jeanie Lucas   MRN:  [id number]  Admitting Diagnosis: Weakness    Recommendations:                 General Recommendations:  Cognitive-linguistic therapy  Diet recommendations:  Regular, Liquid Diet Level: Thin   Aspiration Precautions: Standard aspiration precautions   General Precautions: Standard, fall  Communication strategies:  none    Assessment:     Jeanie Lucas is a 78 y.o. female with an SLP diagnosis of Cognitive-Linguistic Impairment.  She presents with hospital admission and following SWB admission secondary to frequent falls, altered mental status due to metabolic encephalopathy.     Pt presents with change in cognitive status, increased falls, decreased safety awareness, decline in independence with ADLs       Subjective     Pt seen in treatment room post PT session  Patient goals: ready to get back home      Pain/Comfort:       Respiratory Status: Room air    Objective:     Has the patient been evaluated by SLP for swallowing?   Yes  Keep patient NPO? No   Current Respiratory Status:        Tasks completed for increased awareness of safe/unsafe situations with 76% accuracy.  Tasks completed for increased recall of 2-3 details with 75% accuracy.  Tasks completed for processing speed and following of 2-3 directions with 60% accuracy.     Goals:   Multidisciplinary Problems       SLP Goals          Problem: SLP    Goal Priority Disciplines Outcome   SLP Goal     SLP Progressing   Description: Short Term Goals:  Pt will demonstrate orientation to person, place, time with 60% accuracy.  Pt will recall 3 details with 60% accuracy.   Pt will demonstrate ID safe/unsafe situations with 60% accuracy.   Pt will demonstrate problem solving and reasoning skills with 60% accuracy.     Long Term Goals:  Pt will demonstrate orientation to person, place, time with 80% accuracy.  Pt will recall 3 details with 80% accuracy.   Pt will demonstrate ID safe/unsafe  situations with 80% accuracy.   Pt will demonstrate problem solving and reasoning skills with 80% accuracy.                        Plan:     Patient to be seen:  5 x/week   Plan of Care expires:  05/24/24  Plan of Care reviewed with:  patient   SLP Follow-Up:  Yes       Discharge recommendations:  Moderate Intensity Therapy   Barriers to Discharge:  Level of Skilled Assistance Needed currently, decline in cognition and ADLs    Time Tracking:     SLP Treatment Date:   05/09/24  Speech Start Time:  1406  Speech Stop Time:  1428     Speech Total Time (min):  22 min    Billable Minutes: Speech Therapy Individual 22    05/09/2024

## 2024-05-09 NOTE — PLAN OF CARE
Pt has elevated bun AND creatine Dr Calzada has ordered treatment and I have notified pt AND Caridad Vásquez PT'S DAUGHTER DISCHARGE WILL BE POSTPONED TILL Monday AND DEACONESS HAS GOTTEN REFERRAL ON PT both pt and daughter voiced understanding

## 2024-05-09 NOTE — PLAN OF CARE
Problem: Skin Injury Risk Increased  Goal: Skin Health and Integrity  Outcome: Progressing     Problem: Diabetes Comorbidity  Goal: Blood Glucose Level Within Targeted Range  Outcome: Progressing     Problem: Fall Injury Risk  Goal: Absence of Fall and Fall-Related Injury  Outcome: Progressing

## 2024-05-09 NOTE — PLAN OF CARE
Problem: Adult Inpatient Plan of Care  Goal: Plan of Care Review  Outcome: Progressing  Goal: Patient-Specific Goal (Individualized)  Outcome: Progressing  Goal: Absence of Hospital-Acquired Illness or Injury  Outcome: Progressing  Goal: Optimal Comfort and Wellbeing  Outcome: Progressing     Problem: Skin Injury Risk Increased  Goal: Skin Health and Integrity  5/9/2024 1755 by Yajaira Lui, RN  Outcome: Progressing  5/9/2024 1027 by Yajaira Lui, RN  Outcome: Progressing     Problem: Diabetes Comorbidity  Goal: Blood Glucose Level Within Targeted Range  5/9/2024 1755 by Yajaira Lui, RN  Outcome: Progressing  5/9/2024 1027 by Yajaira Lui, RN  Outcome: Progressing     Problem: Fall Injury Risk  Goal: Absence of Fall and Fall-Related Injury  5/9/2024 1755 by Yajaira Lui, RN  Outcome: Progressing  5/9/2024 1027 by Yajaira Lui, RN  Outcome: Progressing

## 2024-05-09 NOTE — PT/OT/SLP PROGRESS
Occupational Therapy   Treatment    Name: Jeanie Lucas  MRN: [id number]  Admitting Diagnosis:  Weakness       Recommendations:     Discharge Recommendations: Low Intensity Therapy  Discharge Equipment Recommendations:  rollator  Barriers to discharge:  Decreased caregiver support    Assessment:     Jeanie Lucas is a 78 y.o. female with a medical diagnosis of Weakness.  She presents with the following performance deficits affecting function are weakness, impaired endurance, impaired balance, decreased safety awareness, impaired cognition.   Patient has made very good progress with upper body strength and ADL independence.     Rehab Prognosis:  Good; patient would benefit from acute skilled OT services to address these deficits and reach maximum level of function.       Plan:     Patient to be seen 5 x/week to address the above listed problems via self-care/home management, therapeutic activities, therapeutic exercises  Plan of Care Expires: 05/17/24  Plan of Care Reviewed with: patient    Subjective     Chief Complaint: none  Patient/Family Comments/goals: pt goal to return home  Pain/Comfort:       Objective:     Communicated with: RN prior to session.  Patient found up in chair with PT   upon OT entry to room.    General Precautions: Standard, fall    Orthopedic Precautions:N/A  Braces: N/A  Respiratory Status: Room air     Occupational Performance:     Bed Mobility:    Patient completed Rolling/Turning to Right with stand by assistance  Patient completed Supine to Sit with stand by assistance     Functional Mobility/Transfers:  Patient completed Sit <> Stand Transfer with stand by assistance  with  4 wheeled walker   Patient completed Bed <> Chair Transfer using Stand Pivot technique with stand by assistance with 4 wheeled walker    Treatment & Education:  Patient completed 10 minutes on UBE ergometer on level 1 with no rest breaks needed, to work on UB strength and endurance in order to complete ADLs and transfers  with less assist.       Patient left up in chair with call button in reach and RN present    GOALS:   Multidisciplinary Problems       Occupational Therapy Goals          Problem: Occupational Therapy    Goal Priority Disciplines Outcome Interventions   Occupational Therapy Goal     OT, PT/OT Progressing    Description: STG:  Pt will perform grooming with I  Pt will bathe self with SVN  Pt will perform UB dressing with SVN  Pt will perform LB dressing with SVN  Pt will transfer to toilet with SBA  Pt will perform standing task x 3 min with SBA    LTG:  Pt will bathe self with I  Pt will perform UB dressing with I  Pt will perform LB dressing with I  Pt will transfer to toilet with I  Pt will perform standing task x 5 min with SBA                        Time Tracking:     OT Date of Treatment: 05/09/24  OT Start Time: 0923  OT Stop Time: 0939  OT Total Time (min): 16 min    Billable Minutes:Therapeutic Exercise 10    OT/SUSANNA: OT     Number of SUSANNA visits since last OT visit: 2    5/9/2024

## 2024-05-10 LAB
ANION GAP SERPL CALCULATED.3IONS-SCNC: 15 MMOL/L (ref 7–16)
ANION GAP SERPL CALCULATED.3IONS-SCNC: 18 MMOL/L (ref 7–16)
BUN SERPL-MCNC: 72 MG/DL (ref 7–18)
BUN SERPL-MCNC: 78 MG/DL (ref 7–18)
BUN/CREAT SERPL: 20 (ref 6–20)
BUN/CREAT SERPL: 21 (ref 6–20)
CALCIUM SERPL-MCNC: 8.2 MG/DL (ref 8.5–10.1)
CALCIUM SERPL-MCNC: 8.4 MG/DL (ref 8.5–10.1)
CHLORIDE SERPL-SCNC: 109 MMOL/L (ref 98–107)
CHLORIDE SERPL-SCNC: 109 MMOL/L (ref 98–107)
CO2 SERPL-SCNC: 19 MMOL/L (ref 21–32)
CO2 SERPL-SCNC: 24 MMOL/L (ref 21–32)
CREAT SERPL-MCNC: 3.58 MG/DL (ref 0.55–1.02)
CREAT SERPL-MCNC: 3.71 MG/DL (ref 0.55–1.02)
EGFR (NO RACE VARIABLE) (RUSH/TITUS): 12 ML/MIN/1.73M2
EGFR (NO RACE VARIABLE) (RUSH/TITUS): 13 ML/MIN/1.73M2
GLUCOSE SERPL-MCNC: 116 MG/DL (ref 70–105)
GLUCOSE SERPL-MCNC: 164 MG/DL (ref 70–105)
GLUCOSE SERPL-MCNC: 172 MG/DL (ref 70–105)
GLUCOSE SERPL-MCNC: 213 MG/DL (ref 70–105)
GLUCOSE SERPL-MCNC: 222 MG/DL (ref 74–106)
GLUCOSE SERPL-MCNC: 277 MG/DL (ref 74–106)
POTASSIUM SERPL-SCNC: 5.3 MMOL/L (ref 3.5–5.1)
POTASSIUM SERPL-SCNC: 5.6 MMOL/L (ref 3.5–5.1)
SODIUM SERPL-SCNC: 140 MMOL/L (ref 136–145)
SODIUM SERPL-SCNC: 143 MMOL/L (ref 136–145)

## 2024-05-10 PROCEDURE — 36415 COLL VENOUS BLD VENIPUNCTURE: CPT | Performed by: NURSE PRACTITIONER

## 2024-05-10 PROCEDURE — 97530 THERAPEUTIC ACTIVITIES: CPT | Mod: CQ

## 2024-05-10 PROCEDURE — 97535 SELF CARE MNGMENT TRAINING: CPT | Mod: CO

## 2024-05-10 PROCEDURE — 80048 BASIC METABOLIC PNL TOTAL CA: CPT | Performed by: NURSE PRACTITIONER

## 2024-05-10 PROCEDURE — 92507 TX SP LANG VOICE COMM INDIV: CPT

## 2024-05-10 PROCEDURE — 25000003 PHARM REV CODE 250: Performed by: FAMILY MEDICINE

## 2024-05-10 PROCEDURE — 63600175 PHARM REV CODE 636 W HCPCS: Performed by: FAMILY MEDICINE

## 2024-05-10 PROCEDURE — 25000003 PHARM REV CODE 250

## 2024-05-10 PROCEDURE — 25000003 PHARM REV CODE 250: Performed by: NURSE PRACTITIONER

## 2024-05-10 PROCEDURE — 99308 SBSQ NF CARE LOW MDM 20: CPT | Mod: ,,, | Performed by: NURSE PRACTITIONER

## 2024-05-10 PROCEDURE — 97110 THERAPEUTIC EXERCISES: CPT | Mod: CQ

## 2024-05-10 PROCEDURE — 82962 GLUCOSE BLOOD TEST: CPT

## 2024-05-10 PROCEDURE — 11000004 HC SNF PRIVATE

## 2024-05-10 PROCEDURE — 97530 THERAPEUTIC ACTIVITIES: CPT | Mod: CO

## 2024-05-10 RX ORDER — SODIUM CHLORIDE 450 MG/100ML
INJECTION, SOLUTION INTRAVENOUS CONTINUOUS
Status: DISPENSED | OUTPATIENT
Start: 2024-05-10 | End: 2024-05-11

## 2024-05-10 RX ADMIN — CARVEDILOL 12.5 MG: 6.25 TABLET, FILM COATED ORAL at 08:05

## 2024-05-10 RX ADMIN — INSULIN GLARGINE 10 UNITS: 100 INJECTION, SOLUTION SUBCUTANEOUS at 08:05

## 2024-05-10 RX ADMIN — ATORVASTATIN CALCIUM 80 MG: 40 TABLET, FILM COATED ORAL at 08:05

## 2024-05-10 RX ADMIN — SODIUM CHLORIDE: 9 INJECTION, SOLUTION INTRAVENOUS at 09:05

## 2024-05-10 RX ADMIN — LATANOPROST 1 DROP: 50 SOLUTION OPHTHALMIC at 08:05

## 2024-05-10 RX ADMIN — ISOSORBIDE MONONITRATE 30 MG: 30 TABLET, EXTENDED RELEASE ORAL at 08:05

## 2024-05-10 RX ADMIN — LEVOTHYROXINE SODIUM 25 MCG: 25 TABLET ORAL at 06:05

## 2024-05-10 RX ADMIN — SERTRALINE HYDROCHLORIDE 25 MG: 25 TABLET ORAL at 08:05

## 2024-05-10 RX ADMIN — SODIUM CHLORIDE: 4.5 INJECTION, SOLUTION INTRAVENOUS at 08:05

## 2024-05-10 RX ADMIN — PANTOPRAZOLE SODIUM 40 MG: 40 TABLET, DELAYED RELEASE ORAL at 08:05

## 2024-05-10 RX ADMIN — ASPIRIN 81 MG: 81 TABLET, COATED ORAL at 08:05

## 2024-05-10 NOTE — SUBJECTIVE & OBJECTIVE
Interval History: Elevated potassium 7.1    Review of Systems   Constitutional:  Negative for activity change, appetite change, chills and fever.   HENT:  Negative for congestion, ear pain, sinus pressure, sinus pain, sore throat and trouble swallowing.    Eyes:  Negative for pain and visual disturbance.   Respiratory:  Negative for cough, chest tightness and shortness of breath.    Cardiovascular:  Negative for chest pain, palpitations and leg swelling.   Gastrointestinal:  Negative for abdominal distention, abdominal pain, diarrhea, nausea and vomiting.   Endocrine: Negative for polydipsia and polyuria.   Genitourinary:  Negative for dysuria, flank pain and frequency.   Musculoskeletal:  Negative for back pain, gait problem and neck pain.   Skin:  Negative for color change and rash.   Neurological:  Negative for dizziness, seizures, weakness, light-headedness, numbness and headaches.   Hematological:  Negative for adenopathy.   Psychiatric/Behavioral:  Negative for agitation and decreased concentration.      Objective:     Vital Signs (Most Recent):  Temp: 98.4 °F (36.9 °C) (05/09/24 2017)  Pulse: 70 (05/09/24 2017)  Resp: 18 (05/09/24 2017)  BP: (!) 152/72 (05/09/24 2017)  SpO2: 99 % (05/09/24 2017) Vital Signs (24h Range):  Temp:  [98.2 °F (36.8 °C)-98.4 °F (36.9 °C)] 98.4 °F (36.9 °C)  Pulse:  [63-71] 70  Resp:  [18] 18  SpO2:  [97 %-100 %] 99 %  BP: (126-152)/(62-72) 152/72     Weight: 53.4 kg (117 lb 12.8 oz)  Body mass index is 21.55 kg/m².    Intake/Output Summary (Last 24 hours) at 5/9/2024 2202  Last data filed at 5/9/2024 1219  Gross per 24 hour   Intake 717 ml   Output --   Net 717 ml         Physical Exam  Vitals and nursing note reviewed.   Constitutional:       General: She is awake. She is not in acute distress.     Appearance: Normal appearance. She is well-developed and well-groomed. She is not ill-appearing, toxic-appearing or diaphoretic.   HENT:      Head: Normocephalic and atraumatic.       Right Ear: External ear normal.      Left Ear: External ear normal.      Nose: Nose normal.      Mouth/Throat:      Mouth: Mucous membranes are moist.   Eyes:      Extraocular Movements: Extraocular movements intact.      Pupils: Pupils are equal, round, and reactive to light.   Cardiovascular:      Rate and Rhythm: Normal rate. Rhythm irregular.      Heart sounds: Normal heart sounds.   Pulmonary:      Effort: Pulmonary effort is normal. No respiratory distress.      Breath sounds: Normal breath sounds. No wheezing.   Abdominal:      General: Abdomen is flat. Bowel sounds are normal. There is no distension.      Palpations: Abdomen is soft.      Tenderness: There is no abdominal tenderness.   Musculoskeletal:      Cervical back: Full passive range of motion without pain, normal range of motion and neck supple.      Right lower leg: No edema.      Left lower leg: No edema.   Skin:     General: Skin is warm and dry.      Capillary Refill: Capillary refill takes less than 2 seconds.   Neurological:      General: No focal deficit present.      Mental Status: She is alert and oriented to person, place, and time. Mental status is at baseline.      Sensory: Sensation is intact.      Motor: Motor function is intact.   Psychiatric:         Attention and Perception: Attention normal.         Mood and Affect: Mood normal.         Speech: Speech normal.         Behavior: Behavior normal. Behavior is cooperative.             Significant Labs: All pertinent labs within the past 24 hours have been reviewed.  Recent Lab Results  (Last 5 results in the past 24 hours)        05/09/24  2031   05/09/24  1920   05/09/24  1733   05/09/24  1718   05/09/24  1633        Anion Gap   20       15       Appearance, UA       Clear         Bilirubin (UA)       Negative         BUN   85       85       BUN/CREAT RATIO   21       21       Calcium   8.7       9.2       Chloride   110       111       CO2   14       21       Color, UA       Yellow          Creatinine   4.00       4.05       eGFR   11       11       Glucose   290       192       Glucose, UA       Negative         Ketones, UA       Negative         Leukocyte Esterase, UA       Negative         NITRITE UA       Negative         Blood, UA       Negative         pH, UA       6.0         POC Glucose 364     209           Potassium   6.5       6.1       Protein, UA       Trace         Sodium   137       141       Specific [place], UA       1.015         UROBILINOGEN UA       0.2                                Significant Imaging: I have reviewed all pertinent imaging results/findings within the past 24 hours.

## 2024-05-10 NOTE — PLAN OF CARE
Care plan reviewed  Problem: Adult Inpatient Plan of Care  Goal: Plan of Care Review  Outcome: Progressing     Problem: Adult Inpatient Plan of Care  Goal: Patient-Specific Goal (Individualized)  Outcome: Progressing     Problem: Adult Inpatient Plan of Care  Goal: Absence of Hospital-Acquired Illness or Injury  Outcome: Progressing     Problem: Adult Inpatient Plan of Care  Goal: Optimal Comfort and Wellbeing  Outcome: Progressing     Problem: Fall Injury Risk  Goal: Absence of Fall and Fall-Related Injury  Outcome: Progressing     Problem: Adult Inpatient Plan of Care  Goal: Readiness for Transition of Care  Outcome: Progressing

## 2024-05-10 NOTE — PT/OT/SLP PROGRESS
Speech Language Pathology Treatment    Patient Name:  Jeanie Lucas   MRN:  [id number]  Admitting Diagnosis: Hyperkalemia    Recommendations:                 General Recommendations:  Cognitive-linguistic therapy  Diet recommendations:  Regular, Liquid Diet Level: Thin   Aspiration Precautions: Standard aspiration precautions   General Precautions: Standard, fall  Communication strategies:  none    Assessment:     Jeanie Lucas is a 78 y.o. female with an SLP diagnosis of Cognitive-Linguistic Impairment.  She presents with hospital admission and following SWB admission secondary to frequent falls, altered mental status due to metabolic encephalopathy.     Pt presents with change in cognitive status, increased falls, decreased safety awareness, decline in independence with ADLs    Pt with scheduled discharge this date, however due to change in medical status pt with delayed discharge until following week.        Subjective     Pt seen at bedside this AM, agree to treatment sitting edge of bed.   Patient goals: ready to get back home      Pain/Comfort:       Respiratory Status: Room air    Objective:     Has the patient been evaluated by SLP for swallowing?   Yes  Keep patient NPO? No   Current Respiratory Status:        Tasks for orientation completed across 15 trials with 80% accuracy.   Task for increased reasoning and sequential thought completed with 85% accuracy, improved ability to follow commands.   Recall simple to moderate information completed with 70% accuracy.       Goals:   Multidisciplinary Problems       SLP Goals          Problem: SLP    Goal Priority Disciplines Outcome   SLP Goal     SLP Progressing   Description: Short Term Goals:  Pt will demonstrate orientation to person, place, time with 60% accuracy.  Pt will recall 3 details with 60% accuracy.   Pt will demonstrate ID safe/unsafe situations with 60% accuracy.   Pt will demonstrate problem solving and reasoning skills with 60% accuracy.     Long  Term Goals:  Pt will demonstrate orientation to person, place, time with 80% accuracy.  Pt will recall 3 details with 80% accuracy.   Pt will demonstrate ID safe/unsafe situations with 80% accuracy.   Pt will demonstrate problem solving and reasoning skills with 80% accuracy.                        Plan:     Patient to be seen:  5 x/week   Plan of Care expires:  05/24/24  Plan of Care reviewed with:  patient   SLP Follow-Up:  Yes       Discharge recommendations:  Moderate Intensity Therapy   Barriers to Discharge:  Level of Skilled Assistance Needed currently, decline in cognition and ADLs    Time Tracking:     SLP Treatment Date:   05/10/24  Speech Start Time:  0815  Speech Stop Time:  0838     Speech Total Time (min):  23 min    Billable Minutes: Speech Therapy Individual 23    05/10/2024

## 2024-05-10 NOTE — ASSESSMENT & PLAN NOTE
05/09/2024: Patient has been working well with PT/OT.  She ambulated 275 feet with rolator today. Planned for discharge on 05/10 has been canceled due to hyperkalemia.   -Continue PT/OT

## 2024-05-10 NOTE — ASSESSMENT & PLAN NOTE
"Patient's FSGs are controlled on current medication regimen.  Last A1c reviewed-   Lab Results   Component Value Date    HGBA1C 7.8 (H) 05/02/2024     Most recent fingerstick glucose reviewed- No results for input(s): "POCTGLUCOSE" in the last 24 hours.  Current correctional scale  Low  Maintain anti-hyperglycemic dose as follows-   Antihyperglycemics (From admission, onward)    Start     Stop Route Frequency Ordered    05/08/24 0900  insulin glargine U-100 (Lantus) injection 10 Units         -- SubQ Daily 05/07/24 0919    05/06/24 1725  insulin aspart U-100 injection 0-10 Units         -- SubQ Before meals & nightly PRN 05/06/24 1625        Hold Oral hypoglycemics while patient is in the hospital.  "

## 2024-05-10 NOTE — PT/OT/SLP PROGRESS
Physical Therapy Treatment    Patient Name:  Jeanie Lucas   MRN:  [id number]    Recommendations:     Discharge Recommendations: Low Intensity Therapy  Discharge Equipment Recommendations: rollator  Barriers to discharge: Decreased caregiver support    Assessment:     Jeanie Lucas is a 78 y.o. female admitted with a medical diagnosis of Hyperkalemia.  She presents with the following impairments/functional limitations: weakness, impaired endurance .    Pt presented with loose bowels, despite feeling weak, pt agreed to seated exercises only today.     Rehab Prognosis: Good; patient would benefit from acute skilled PT services to address these deficits and reach maximum level of function.    Recent Surgery: * No surgery found *      Plan:     During this hospitalization, patient to be seen 5 x/week to address the identified rehab impairments via therapeutic activities, therapeutic exercises and progress toward the following goals:    Plan of Care Expires:       Subjective     Chief Complaint: no pain stated, pt c/o diarrhea and weakness  Patient/Family Comments/goals: increased BLE strengthening and endurance  Pain/Comfort:  Pain Rating 1: 0/10  Pain Rating Post-Intervention 1: 0/10  Pain Rating Post-Intervention 2: 0/10      Objective:     Communicated with pt prior to session.  Patient found  sitting on toliet  with   upon PT entry to room.     General Precautions: Standard, fall  Orthopedic Precautions: N/A  Braces: N/A  Respiratory Status: Room air     Functional Mobility:  Transfers:     Sit to Stand:  stand by assistance with rolling walker  Toilet Transfer: contact guard assistance with  rolling walker  using  Step Transfer      AM-PAC 6 CLICK MOBILITY          Treatment & Education:  Pt performed 3x10 of the following with 3#  LAQs  Hip ADD/ABD  Marches    Pt performed toliet/sandra chair CGA with RW, sit/stand x 3 SBA with RW with PTA cues for wider ANUJ, cues for standing posture.     Patient left up in chair with   SHIRLENE present..    GOALS:   Multidisciplinary Problems       Physical Therapy Goals       Not on file                    Time Tracking:     PT Received On: 05/10/24  PT Start Time: 0900     PT Stop Time: 0923  PT Total Time (min): 23 min     Billable Minutes: Therapeutic Activity 8 and Therapeutic Exercise 15    Treatment Type: Treatment  PT/PTA: PTA     Number of PTA visits since last PT visit: 1     05/10/2024

## 2024-05-10 NOTE — PT/OT/SLP PROGRESS
Occupational Therapy   Treatment    Name: Jeanie Lucas  MRN: [id number]  Admitting Diagnosis:  Hyperkalemia       Recommendations:     Discharge Recommendations: Low Intensity Therapy  Discharge Equipment Recommendations:  rollator  Barriers to discharge:       Assessment:     Jeanie Lucas is a 78 y.o. female with a medical diagnosis of Hyperkalemia.  She presents with the following Performance deficits affecting function are weakness, impaired endurance, impaired balance, decreased safety awareness.     Rehab Prognosis:  Fair; patient would benefit from acute skilled OT services to address these deficits and reach maximum level of function.       Plan:     Patient to be seen 5 x/week to address the above listed problems via self-care/home management, therapeutic activities, therapeutic exercises  Plan of Care Expires: 05/17/24  Plan of Care Reviewed with: patient    Subjective   Patient was sitting up in her recliner chair in therapy gym after PT session upon GARBER arrival. Patient stated she was feeling fine with no complaints of pain and was agreeable to participate in todays session.   Patient/Family Comments/goals: to increase UB strength and endurance in order to return home  Pain/Comfort:  Pain Rating 1: 0/10    Objective:     Communicated with: nursing staff prior to session.  Patient found up in chair with peripheral IV upon OT entry to room.    General Precautions: Standard, fall    Orthopedic Precautions:N/A  Braces: N/A  Respiratory Status: Room air     Occupational Performance:     Bed Mobility:    Patient completed Sit to Supine with supervision     Functional Mobility/Transfers:  Patient completed Sit <> Stand Transfer with contact guard assistance  with  4 wheeled walker   Patient completed Toilet Transfer Step Transfer technique with contact guard assistance with  4 wheeled walker    Activities of Daily Living:  Toileting: supervision Patient completed toilet transfer from chair to BSC over toilet in  bathroom to complete toileting, hygiene and clothing management with SVN. Increased time/effort needed.   LB dressing: supervision While sitting EOB, patient completed LB dressing of donning clean brief with SVN. Increased time/effort needed.     Therapeutic activities:  Pt completed 8 sit to stand transfers with supervision to work on tricep strength, stand tolerance and endurance in preparation to complete ADLs and transfers from different surfaces such as BSC, chair, etc. with less assistance. Increased time/effort needed.   While standing, pt engaged in standing tolerance activity to increase overall tolerance and static/dynamic stand balance in order to complete ADLs standing at sink with less assistance. Pt stood 3:12 minutes with Supervision or Set-up Assistance before requiring a rest break.       Patient left HOB elevated with call button in reach and RN notified    GOALS:   Multidisciplinary Problems       Occupational Therapy Goals          Problem: Occupational Therapy    Goal Priority Disciplines Outcome Interventions   Occupational Therapy Goal     OT, PT/OT Progressing    Description: STG:  Pt will perform grooming with I  Pt will bathe self with SVN  (Goal met)  Pt will perform UB dressing with SVN   (Goal met)  Pt will perform LB dressing with SVN   (Goal met)  Pt will transfer to toilet with SBA  (Goal met)  Pt will perform standing task x 3 min with SBA  (Goal met)    LTG:  Pt will bathe self with I    (SVN - Setup)  Pt will perform UB dressing with I   (SVN - Setup)  Pt will perform LB dressing with I   (SVN - Setup)  Pt will transfer to toilet with I   (SVN - Mod I)  Pt will perform standing task x 5 min with SBA   (Partially met)                       Time Tracking:     OT Date of Treatment: 05/10/24  OT Start Time: 0923  OT Stop Time: 0946  OT Total Time (min): 23 min    Billable Minutes:Self Care/Home Management 13 minutes  Therapeutic Activity 10 minutes    OT/SUSANNA: SUSANNA     Number of SUSANNA  visits since last OT visit: 1    SUSANNA Galvan  5/10/2024  2:21 PM

## 2024-05-10 NOTE — ASSESSMENT & PLAN NOTE
05/09/2024: This patient has hyperkalemia which is uncontrolled. We will monitor for arrhythmias with EKG or continuous telemetry. The likely etiology of the hyperkalemia is Medication induced and CKD. Levaquin and Spironolactone was discontinued. Spoke with Dr. Henderson regarding patient's case after repeat BMP and orders received.  The patients latest potassium has been reviewed and the results are listed below  Recent Labs   Lab 05/09/24  1920   K 6.5*     -Calcium Gluconate 1 GM IV   -Sodium Bicarb 1 amp  -Continue Sodium Bicarb infusion at 75 ml/hr  -Kayexalate 15 g po x 1  -Start  ml/hr  -Repeat BMP on 05/10 at 0200

## 2024-05-10 NOTE — HOSPITAL COURSE
05/09/2024: Patient is SWB day #8 for PT/OT rehabilitation for weakness. Patient ambulated 275 feet with rollator today. Labs today patient was found to have K+7.1, Cl 109, CO2 16, BUN/CR 90/3.95, glucose 178 mg/dL. EKG done with no peaked T waves, P waves present, QRS interval at 150. Spoke with Dr. Calzada regarding patient. Patient was given Calcium Gluconate 1 gm, Sodium Bicarb x 2 amps, started Sodium Bicarb infusion at 75 ml/hr, Regular insulin 5 units, D50 x1, Kayexalate and albuterol neb. Patient's discharge for 05/10 has been canceled.   Repeat BMP at 19:20 was K+ 6.5, Cl 11, CO2 14, BUN/CR 85/4.0, glucose 290 mg/dL. VS stable. Spoke with Dr. Henderson, hospitalist at [facility] and orders received.     05/16/2024: SWB day # 15 for PT/OT rehabilitation. Patient is currently ambulating 300 ft with rollator. Patient is currently on Lokelma 5 mg daily. Potassium is down to 4.7, BUN/CR 52/2.69 which is at baseline. Plan to discharge home tomorrow with [facility].

## 2024-05-10 NOTE — ASSESSMENT & PLAN NOTE
05/09/2024: Chronic, controlled. Latest blood pressure and vitals reviewed-     Temp:  [98.2 °F (36.8 °C)-98.4 °F (36.9 °C)]   Pulse:  [63-73]   Resp:  [18]   BP: (126-160)/(62-76)   SpO2:  [97 %-100 %] .   Home meds for hypertension were reviewed and noted below.   Hypertension Medications               carvediloL (COREG) 12.5 MG tablet Take 12.5 mg by mouth 2 (two) times daily.    furosemide (LASIX) 20 MG tablet Take 20 mg by mouth daily as needed (lower extremity edema).    isosorbide mononitrate (IMDUR) 30 MG 24 hr tablet Take 30 mg by mouth once daily.               While in the hospital, will manage blood pressure as follows; Continue home antihypertensive regimen. Discontinued spironolactone due to hyperkalemia.    Will utilize p.r.n. blood pressure medication only if patient's blood pressure greater than 180/110 and she develops symptoms such as worsening chest pain or shortness of breath.

## 2024-05-10 NOTE — PROGRESS NOTES
[facility] - Medical Surgical Unit  Hospital Medicine  Progress Note    Patient Name: Jeanie Lucas  MRN: [id number]  Patient Class: IP- Swing   Admission Date: 5/2/2024  Length of Stay: 8 days  Attending Physician: Jose Calzada Jr., MD  Primary Care Provider: Arabella Ann FNP        Subjective:     Principal Problem:Hyperkalemia        HPI:  Mrs. Lucas is a very pleasant 77 y/o AAF with a PMHx significant for HTN, Thyroid Disease, Dementia, Combined Systolic and Diastolic Heart Failure, GERD, Hyperlipidemia, Depression, and Type 1 DM presented to the Emergency Department at [facility] in [place] for frequent falls and altered mental status. CT Head w/o contrast was negative for any acute intracranial process and patient was subsequently diagnosed with metabolic encephalopathy related to intermittent hypoglycemia along with dementia.    On interview she seems lucid.  Feeling somewhat weak overall.  Looking forward to Rehab services.     Overview/Hospital Course:  05/09/2024: Patient is SWB day #8 for PT/OT rehabilitation for weakness. Patient ambulated 275 feet with rollator today. Labs today patient was found to have K+7.1, Cl 109, CO2 16, BUN/CR 90/3.95, glucose 178 mg/dL. EKG done with no peaked T waves, P waves present, QRS interval at 150. Spoke with Dr. Calzada regarding patient. Patient was given Calcium Gluconate 1 gm, Sodium Bicarb x 2 amps, started Sodium Bicarb infusion at 75 ml/hr, Regular insulin 5 units, D50 x1, Kayexalate and albuterol neb. Patient's discharge for 05/10 has been canceled.   Repeat BMP at 19:20 was K+ 6.5, Cl 11, CO2 14, BUN/CR 85/4.0, glucose 290 mg/dL. VS stable. Spoke with Dr. Henderson, hospitalist at [facility] and orders received.     Interval History: Elevated potassium 7.1    Review of Systems   Constitutional:  Negative for activity change, appetite change, chills and fever.   HENT:  Negative for congestion, ear pain, sinus pressure, sinus pain, sore throat  and trouble swallowing.    Eyes:  Negative for pain and visual disturbance.   Respiratory:  Negative for cough, chest tightness and shortness of breath.    Cardiovascular:  Negative for chest pain, palpitations and leg swelling.   Gastrointestinal:  Negative for abdominal distention, abdominal pain, diarrhea, nausea and vomiting.   Endocrine: Negative for polydipsia and polyuria.   Genitourinary:  Negative for dysuria, flank pain and frequency.   Musculoskeletal:  Negative for back pain, gait problem and neck pain.   Skin:  Negative for color change and rash.   Neurological:  Negative for dizziness, seizures, weakness, light-headedness, numbness and headaches.   Hematological:  Negative for adenopathy.   Psychiatric/Behavioral:  Negative for agitation and decreased concentration.      Objective:     Vital Signs (Most Recent):  Temp: 98.4 °F (36.9 °C) (05/09/24 2017)  Pulse: 70 (05/09/24 2017)  Resp: 18 (05/09/24 2017)  BP: (!) 152/72 (05/09/24 2017)  SpO2: 99 % (05/09/24 2017) Vital Signs (24h Range):  Temp:  [98.2 °F (36.8 °C)-98.4 °F (36.9 °C)] 98.4 °F (36.9 °C)  Pulse:  [63-71] 70  Resp:  [18] 18  SpO2:  [97 %-100 %] 99 %  BP: (126-152)/(62-72) 152/72     Weight: 53.4 kg (117 lb 12.8 oz)  Body mass index is 21.55 kg/m².    Intake/Output Summary (Last 24 hours) at 5/9/2024 2202  Last data filed at 5/9/2024 1219  Gross per 24 hour   Intake 717 ml   Output --   Net 717 ml         Physical Exam  Vitals and nursing note reviewed.   Constitutional:       General: She is awake. She is not in acute distress.     Appearance: Normal appearance. She is well-developed and well-groomed. She is not ill-appearing, toxic-appearing or diaphoretic.   HENT:      Head: Normocephalic and atraumatic.      Right Ear: External ear normal.      Left Ear: External ear normal.      Nose: Nose normal.      Mouth/Throat:      Mouth: Mucous membranes are moist.   Eyes:      Extraocular Movements: Extraocular movements intact.      Pupils:  Pupils are equal, round, and reactive to light.   Cardiovascular:      Rate and Rhythm: Normal rate. Rhythm irregular.      Heart sounds: Normal heart sounds.   Pulmonary:      Effort: Pulmonary effort is normal. No respiratory distress.      Breath sounds: Normal breath sounds. No wheezing.   Abdominal:      General: Abdomen is flat. Bowel sounds are normal. There is no distension.      Palpations: Abdomen is soft.      Tenderness: There is no abdominal tenderness.   Musculoskeletal:      Cervical back: Full passive range of motion without pain, normal range of motion and neck supple.      Right lower leg: No edema.      Left lower leg: No edema.   Skin:     General: Skin is warm and dry.      Capillary Refill: Capillary refill takes less than 2 seconds.   Neurological:      General: No focal deficit present.      Mental Status: She is alert and oriented to person, place, and time. Mental status is at baseline.      Sensory: Sensation is intact.      Motor: Motor function is intact.   Psychiatric:         Attention and Perception: Attention normal.         Mood and Affect: Mood normal.         Speech: Speech normal.         Behavior: Behavior normal. Behavior is cooperative.             Significant Labs: All pertinent labs within the past 24 hours have been reviewed.  Recent Lab Results  (Last 5 results in the past 24 hours)        05/09/24  2031   05/09/24  1920   05/09/24  1733   05/09/24  1718   05/09/24  1633        Anion Gap   20       15       Appearance, UA       Clear         Bilirubin (UA)       Negative         BUN   85       85       BUN/CREAT RATIO   21       21       Calcium   8.7       9.2       Chloride   110       111       CO2   14       21       Color, UA       Yellow         Creatinine   4.00       4.05       eGFR   11       11       Glucose   290       192       Glucose, UA       Negative         Ketones, UA       Negative         Leukocyte Esterase, UA       Negative         NITRITE UA        Negative         Blood, UA       Negative         pH, UA       6.0         POC Glucose 364     209           Potassium   6.5       6.1       Protein, UA       Trace         Sodium   137       141       Specific Glennie, UA       1.015         UROBILINOGEN UA       0.2                                Significant Imaging: I have reviewed all pertinent imaging results/findings within the past 24 hours.    Assessment/Plan:      * Hyperkalemia  05/09/2024: This patient has hyperkalemia which is uncontrolled. We will monitor for arrhythmias with EKG or continuous telemetry. The likely etiology of the hyperkalemia is Medication induced and CKD. Levaquin and Spironolactone was discontinued. Spoke with Dr. Henderson regarding patient's case after repeat BMP and orders received.  The patients latest potassium has been reviewed and the results are listed below  Recent Labs   Lab 05/09/24  1920   K 6.5*     -Calcium Gluconate 1 GM IV   -Sodium Bicarb 1 amp  -Continue Sodium Bicarb infusion at 75 ml/hr  -Kayexalate 15 g po x 1  -Start  ml/hr  -Repeat BMP on 05/10 at 0200          Weakness  05/09/2024: Patient has been working well with PT/OT.  She ambulated 275 feet with rolator today. Planned for discharge on 05/10 has been canceled due to hyperkalemia.   -Continue PT/OT    Recurrent major depressive disorder, in remission  Patient has persistent depression which is mild and is currently controlled. Will Continue anti-depressant medications. We will not consult psychiatry at this time. Patient does not display psychosis at this time. Continue to monitor closely and adjust plan of care as needed.        Hyperlipidemia    Substitute lipitor 80 for home crestor 40.    Gastroesophageal reflux disease without esophagitis    Protonix 40mg daily    Type 1 diabetes mellitus with hypoglycemia  Patient's FSGs are controlled on current medication regimen.  Last A1c reviewed-   Lab Results   Component Value Date    HGBA1C 7.8 (H) 05/02/2024  "    Most recent fingerstick glucose reviewed- No results for input(s): "POCTGLUCOSE" in the last 24 hours.  Current correctional scale  Low  Maintain anti-hyperglycemic dose as follows-   Antihyperglycemics (From admission, onward)      Start     Stop Route Frequency Ordered    05/08/24 0900  insulin glargine U-100 (Lantus) injection 10 Units         -- SubQ Daily 05/07/24 0919    05/06/24 1725  insulin aspart U-100 injection 0-10 Units         -- SubQ Before meals & nightly PRN 05/06/24 1625          Hold Oral hypoglycemics while patient is in the hospital.    Thyroid disease    Continue home dose synthroid. 25 mcg daily    Primary hypertension  05/09/2024: Chronic, controlled. Latest blood pressure and vitals reviewed-     Temp:  [98.2 °F (36.8 °C)-98.4 °F (36.9 °C)]   Pulse:  [63-73]   Resp:  [18]   BP: (126-160)/(62-76)   SpO2:  [97 %-100 %] .   Home meds for hypertension were reviewed and noted below.   Hypertension Medications               carvediloL (COREG) 12.5 MG tablet Take 12.5 mg by mouth 2 (two) times daily.    furosemide (LASIX) 20 MG tablet Take 20 mg by mouth daily as needed (lower extremity edema).    isosorbide mononitrate (IMDUR) 30 MG 24 hr tablet Take 30 mg by mouth once daily.               While in the hospital, will manage blood pressure as follows; Continue home antihypertensive regimen. Discontinued spironolactone due to hyperkalemia.    Will utilize p.r.n. blood pressure medication only if patient's blood pressure greater than 180/110 and she develops symptoms such as worsening chest pain or shortness of breath.      VTE Risk Mitigation (From admission, onward)           Ordered     IP VTE HIGH RISK PATIENT  Once         05/02/24 1758     Place sequential compression device  Until discontinued         05/02/24 1758                    Discharge Planning   JENNIE: 5/24/2024     Code Status: Full Code   Is the patient medically ready for discharge?:     Reason for patient still in hospital " (select all that apply): Patient new problem, Laboratory test, Treatment, and PT / OT recommendations  Discharge Plan A: Home, Home Health   Discharge Delays: None known at this time            MARIO Self  Department of Hospital Medicine   [facility] - Medical Surgical Unit

## 2024-05-11 LAB
GLUCOSE SERPL-MCNC: 112 MG/DL (ref 70–105)
GLUCOSE SERPL-MCNC: 116 MG/DL (ref 70–105)
GLUCOSE SERPL-MCNC: 119 MG/DL (ref 70–105)
GLUCOSE SERPL-MCNC: 177 MG/DL (ref 70–105)

## 2024-05-11 PROCEDURE — 11000004 HC SNF PRIVATE

## 2024-05-11 PROCEDURE — 82962 GLUCOSE BLOOD TEST: CPT

## 2024-05-11 PROCEDURE — 25000003 PHARM REV CODE 250: Performed by: FAMILY MEDICINE

## 2024-05-11 PROCEDURE — 63600175 PHARM REV CODE 636 W HCPCS: Performed by: FAMILY MEDICINE

## 2024-05-11 PROCEDURE — 25000003 PHARM REV CODE 250

## 2024-05-11 RX ADMIN — CARVEDILOL 12.5 MG: 6.25 TABLET, FILM COATED ORAL at 09:05

## 2024-05-11 RX ADMIN — SODIUM CHLORIDE: 4.5 INJECTION, SOLUTION INTRAVENOUS at 04:05

## 2024-05-11 RX ADMIN — INSULIN GLARGINE 10 UNITS: 100 INJECTION, SOLUTION SUBCUTANEOUS at 09:05

## 2024-05-11 RX ADMIN — LEVOTHYROXINE SODIUM 25 MCG: 25 TABLET ORAL at 05:05

## 2024-05-11 RX ADMIN — SERTRALINE HYDROCHLORIDE 25 MG: 25 TABLET ORAL at 09:05

## 2024-05-11 RX ADMIN — LATANOPROST 1 DROP: 50 SOLUTION OPHTHALMIC at 09:05

## 2024-05-11 RX ADMIN — ASPIRIN 81 MG: 81 TABLET, COATED ORAL at 09:05

## 2024-05-11 RX ADMIN — PANTOPRAZOLE SODIUM 40 MG: 40 TABLET, DELAYED RELEASE ORAL at 09:05

## 2024-05-11 RX ADMIN — ATORVASTATIN CALCIUM 80 MG: 40 TABLET, FILM COATED ORAL at 09:05

## 2024-05-11 RX ADMIN — ISOSORBIDE MONONITRATE 30 MG: 30 TABLET, EXTENDED RELEASE ORAL at 09:05

## 2024-05-11 NOTE — PLAN OF CARE
Problem: Adult Inpatient Plan of Care  Goal: Plan of Care Review  Outcome: Progressing     Problem: Diabetes Comorbidity  Goal: Blood Glucose Level Within Targeted Range  Outcome: Progressing     Problem: Skin Injury Risk Increased  Goal: Skin Health and Integrity  Outcome: Progressing     Problem: Fall Injury Risk  Goal: Absence of Fall and Fall-Related Injury  Outcome: Progressing

## 2024-05-11 NOTE — PLAN OF CARE
Care plan reviewed  Problem: Adult Inpatient Plan of Care  Goal: Plan of Care Review  Outcome: Progressing     Problem: Adult Inpatient Plan of Care  Goal: Patient-Specific Goal (Individualized)  Outcome: Progressing     Problem: Adult Inpatient Plan of Care  Goal: Absence of Hospital-Acquired Illness or Injury  Outcome: Progressing     Problem: Adult Inpatient Plan of Care  Goal: Optimal Comfort and Wellbeing  Outcome: Progressing     Problem: Fall Injury Risk  Goal: Absence of Fall and Fall-Related Injury  Outcome: Progressing

## 2024-05-12 LAB
ANION GAP SERPL CALCULATED.3IONS-SCNC: 17 MMOL/L (ref 7–16)
BUN SERPL-MCNC: 59 MG/DL (ref 7–18)
BUN/CREAT SERPL: 20 (ref 6–20)
CALCIUM SERPL-MCNC: 8 MG/DL (ref 8.5–10.1)
CHLORIDE SERPL-SCNC: 112 MMOL/L (ref 98–107)
CO2 SERPL-SCNC: 19 MMOL/L (ref 21–32)
CREAT SERPL-MCNC: 3 MG/DL (ref 0.55–1.02)
EGFR (NO RACE VARIABLE) (RUSH/TITUS): 16 ML/MIN/1.73M2
GLUCOSE SERPL-MCNC: 104 MG/DL (ref 70–105)
GLUCOSE SERPL-MCNC: 121 MG/DL (ref 70–105)
GLUCOSE SERPL-MCNC: 193 MG/DL (ref 74–106)
GLUCOSE SERPL-MCNC: 93 MG/DL (ref 70–105)
POTASSIUM SERPL-SCNC: 5.8 MMOL/L (ref 3.5–5.1)
SODIUM SERPL-SCNC: 142 MMOL/L (ref 136–145)

## 2024-05-12 PROCEDURE — 11000004 HC SNF PRIVATE

## 2024-05-12 PROCEDURE — 36415 COLL VENOUS BLD VENIPUNCTURE: CPT | Performed by: FAMILY MEDICINE

## 2024-05-12 PROCEDURE — 80048 BASIC METABOLIC PNL TOTAL CA: CPT | Performed by: FAMILY MEDICINE

## 2024-05-12 PROCEDURE — 82962 GLUCOSE BLOOD TEST: CPT

## 2024-05-12 PROCEDURE — 63600175 PHARM REV CODE 636 W HCPCS: Performed by: FAMILY MEDICINE

## 2024-05-12 PROCEDURE — 63600175 PHARM REV CODE 636 W HCPCS: Performed by: REGISTERED NURSE

## 2024-05-12 PROCEDURE — 25000003 PHARM REV CODE 250

## 2024-05-12 PROCEDURE — 25000003 PHARM REV CODE 250: Performed by: REGISTERED NURSE

## 2024-05-12 RX ADMIN — ISOSORBIDE MONONITRATE 30 MG: 30 TABLET, EXTENDED RELEASE ORAL at 09:05

## 2024-05-12 RX ADMIN — LEVOTHYROXINE SODIUM 25 MCG: 25 TABLET ORAL at 05:05

## 2024-05-12 RX ADMIN — ATORVASTATIN CALCIUM 80 MG: 40 TABLET, FILM COATED ORAL at 08:05

## 2024-05-12 RX ADMIN — CARVEDILOL 12.5 MG: 6.25 TABLET, FILM COATED ORAL at 09:05

## 2024-05-12 RX ADMIN — SERTRALINE HYDROCHLORIDE 25 MG: 25 TABLET ORAL at 09:05

## 2024-05-12 RX ADMIN — LATANOPROST 1 DROP: 50 SOLUTION OPHTHALMIC at 09:05

## 2024-05-12 RX ADMIN — CARVEDILOL 12.5 MG: 6.25 TABLET, FILM COATED ORAL at 08:05

## 2024-05-12 RX ADMIN — ASPIRIN 81 MG: 81 TABLET, COATED ORAL at 09:05

## 2024-05-12 RX ADMIN — INSULIN ASPART 2 UNITS: 100 INJECTION, SOLUTION INTRAVENOUS; SUBCUTANEOUS at 06:05

## 2024-05-12 RX ADMIN — PANTOPRAZOLE SODIUM 40 MG: 40 TABLET, DELAYED RELEASE ORAL at 09:05

## 2024-05-12 RX ADMIN — SODIUM POLYSTYRENE SULFONATE 15 G: 15 SUSPENSION ORAL; RECTAL at 03:05

## 2024-05-12 RX ADMIN — INSULIN GLARGINE 10 UNITS: 100 INJECTION, SOLUTION SUBCUTANEOUS at 09:05

## 2024-05-12 NOTE — PLAN OF CARE
Problem: Adult Inpatient Plan of Care  Goal: Plan of Care Review  Outcome: Progressing     Problem: Diabetes Comorbidity  Goal: Blood Glucose Level Within Targeted Range  Outcome: Progressing     Problem: Fall Injury Risk  Goal: Absence of Fall and Fall-Related Injury  Outcome: Progressing      O-Z Flap Text: The defect edges were debeveled with a #15 scalpel blade.  Given the location of the defect, shape of the defect and the proximity to free margins an O-Z flap was deemed most appropriate.  Using a sterile surgical marker, an appropriate transposition flap was drawn incorporating the defect and placing the expected incisions within the relaxed skin tension lines where possible. The area thus outlined was incised deep to adipose tissue with a #15 scalpel blade.  The skin margins were undermined to an appropriate distance in all directions utilizing iris scissors.

## 2024-05-13 LAB
ANION GAP SERPL CALCULATED.3IONS-SCNC: 18 MMOL/L (ref 7–16)
BUN SERPL-MCNC: 57 MG/DL (ref 7–18)
BUN/CREAT SERPL: 19 (ref 6–20)
CALCIUM SERPL-MCNC: 8.2 MG/DL (ref 8.5–10.1)
CHLORIDE SERPL-SCNC: 114 MMOL/L (ref 98–107)
CO2 SERPL-SCNC: 18 MMOL/L (ref 21–32)
CREAT SERPL-MCNC: 3 MG/DL (ref 0.55–1.02)
EGFR (NO RACE VARIABLE) (RUSH/TITUS): 16 ML/MIN/1.73M2
GLUCOSE SERPL-MCNC: 102 MG/DL (ref 70–105)
GLUCOSE SERPL-MCNC: 148 MG/DL (ref 70–105)
GLUCOSE SERPL-MCNC: 169 MG/DL (ref 70–105)
GLUCOSE SERPL-MCNC: 173 MG/DL (ref 70–105)
GLUCOSE SERPL-MCNC: 98 MG/DL (ref 74–106)
OHS QRS DURATION: 150 MS
OHS QTC CALCULATION: 464 MS
POTASSIUM SERPL-SCNC: 6 MMOL/L (ref 3.5–5.1)
SODIUM SERPL-SCNC: 144 MMOL/L (ref 136–145)

## 2024-05-13 PROCEDURE — 97110 THERAPEUTIC EXERCISES: CPT

## 2024-05-13 PROCEDURE — 63600175 PHARM REV CODE 636 W HCPCS: Performed by: REGISTERED NURSE

## 2024-05-13 PROCEDURE — 97110 THERAPEUTIC EXERCISES: CPT | Mod: CQ

## 2024-05-13 PROCEDURE — 11000004 HC SNF PRIVATE

## 2024-05-13 PROCEDURE — 97535 SELF CARE MNGMENT TRAINING: CPT

## 2024-05-13 PROCEDURE — 80048 BASIC METABOLIC PNL TOTAL CA: CPT | Performed by: FAMILY MEDICINE

## 2024-05-13 PROCEDURE — 25000003 PHARM REV CODE 250: Performed by: NURSE PRACTITIONER

## 2024-05-13 PROCEDURE — 25000003 PHARM REV CODE 250

## 2024-05-13 PROCEDURE — 92507 TX SP LANG VOICE COMM INDIV: CPT

## 2024-05-13 PROCEDURE — 97116 GAIT TRAINING THERAPY: CPT | Mod: CQ

## 2024-05-13 PROCEDURE — 63600175 PHARM REV CODE 636 W HCPCS: Performed by: FAMILY MEDICINE

## 2024-05-13 PROCEDURE — 82962 GLUCOSE BLOOD TEST: CPT

## 2024-05-13 PROCEDURE — 36415 COLL VENOUS BLD VENIPUNCTURE: CPT | Performed by: FAMILY MEDICINE

## 2024-05-13 RX ADMIN — PANTOPRAZOLE SODIUM 40 MG: 40 TABLET, DELAYED RELEASE ORAL at 08:05

## 2024-05-13 RX ADMIN — LEVOTHYROXINE SODIUM 25 MCG: 25 TABLET ORAL at 05:05

## 2024-05-13 RX ADMIN — INSULIN GLARGINE 10 UNITS: 100 INJECTION, SOLUTION SUBCUTANEOUS at 08:05

## 2024-05-13 RX ADMIN — ATORVASTATIN CALCIUM 80 MG: 40 TABLET, FILM COATED ORAL at 09:05

## 2024-05-13 RX ADMIN — LATANOPROST 1 DROP: 50 SOLUTION OPHTHALMIC at 09:05

## 2024-05-13 RX ADMIN — INSULIN ASPART 2 UNITS: 100 INJECTION, SOLUTION INTRAVENOUS; SUBCUTANEOUS at 11:05

## 2024-05-13 RX ADMIN — CARVEDILOL 12.5 MG: 6.25 TABLET, FILM COATED ORAL at 08:05

## 2024-05-13 RX ADMIN — SERTRALINE HYDROCHLORIDE 25 MG: 25 TABLET ORAL at 08:05

## 2024-05-13 RX ADMIN — ASPIRIN 81 MG: 81 TABLET, COATED ORAL at 08:05

## 2024-05-13 RX ADMIN — SODIUM ZIRCONIUM CYCLOSILICATE 10 G: 5 POWDER, FOR SUSPENSION ORAL at 03:05

## 2024-05-13 RX ADMIN — ISOSORBIDE MONONITRATE 30 MG: 30 TABLET, EXTENDED RELEASE ORAL at 08:05

## 2024-05-13 RX ADMIN — CARVEDILOL 12.5 MG: 6.25 TABLET, FILM COATED ORAL at 09:05

## 2024-05-13 NOTE — PLAN OF CARE
Problem: Adult Inpatient Plan of Care  Goal: Plan of Care Review  5/13/2024 1434 by Misty Greenwood, RN  Outcome: Progressing  5/13/2024 1434 by Misty Greenwood, RN  Outcome: Progressing

## 2024-05-13 NOTE — PT/OT/SLP PROGRESS
Occupational Therapy   Treatment    Name: Jeanie Lucas  MRN: [id number]  Admitting Diagnosis:  Hyperkalemia       Recommendations:     Discharge Recommendations: Low Intensity Therapy  Discharge Equipment Recommendations:  rollator  Barriers to discharge:  Decreased caregiver support    Assessment:     Jeanie Lucas is a 78 y.o. female with a medical diagnosis of Hyperkalemia.  She presents with the following performance deficits affecting function are weakness, impaired endurance, impaired balance, decreased safety awareness.   Patient continue to make very good progress with upper body strength and ADL independence.     Rehab Prognosis:  Good; patient would benefit from acute skilled OT services to address these deficits and reach maximum level of function.       Plan:     Patient to be seen 5 x/week to address the above listed problems via self-care/home management, therapeutic activities, therapeutic exercises  Plan of Care Expires: 05/17/24  Plan of Care Reviewed with: patient    Subjective     Chief Complaint: none  Patient/Family Comments/goals: pt goal to return home  Pain/Comfort:       Objective:     Communicated with: RN prior to session.  Patient found up in chair with PT   upon OT entry to room.    General Precautions: Standard, fall    Orthopedic Precautions:N/A  Braces: N/A  Respiratory Status: Room air     Occupational Performance:     Self care/ADL:  Toileting: Patient completed toilet transfer from chair to BSC over toilet in bathroom to complete toileting, hygiene and clothing management with SVN.    UB and LB dressing: While sitting EOB, patient completed UB and LB dressing of donning clean brief with SVN.     Functional Mobility/Transfers:  Patient completed Sit <> Stand Transfer with stand by assistance  with  4 wheeled walker   Patient completed Bed <> Chair Transfer using Stand Pivot technique with stand by assistance with 4 wheeled walker    Treatment & Education:  Patient completed the  following exercises to work on UB strength in order to complete ADLs, bed mobility, and transfers with less assistance.  -Bicep curls: 2 sets of 10 with 2# dumbbell  -Chest press: 2 sets of 10 with 2# dumbbell  -Shoulder flex: 2 sets of 10 with 2# dumbbell  -Internal/External rotation: 2 sets of 10 with 2# dumbbell  -Shoulder rows: 2 sets of 15 with green theraband  -Shoulder horiz abduction: 2 sets of 15 with red theraband    Patient completed 8 minutes on UBE ergometer on level 1 with no rest breaks needed, to work on UB strength and endurance in order to complete ADLs and transfers with less assist.     OT met face to face and performed supervision visit with SHIRLENE Galvan to discuss patient's diagnosis and POC including goals, intervention strategies and D/C planning.     Patient left up in chair with call button in reach and SLP present    GOALS:   Multidisciplinary Problems       Occupational Therapy Goals          Problem: Occupational Therapy    Goal Priority Disciplines Outcome Interventions   Occupational Therapy Goal     OT, PT/OT Progressing    Description: STG:  Pt will perform grooming with I  Pt will bathe self with SVN  (Goal met)  Pt will perform UB dressing with SVN   (Goal met)  Pt will perform LB dressing with SVN   (Goal met)  Pt will transfer to toilet with SBA  (Goal met)  Pt will perform standing task x 3 min with SBA  (Goal met)    LTG:  Pt will bathe self with I    (SVN - Setup)  Pt will perform UB dressing with I   (SVN - Setup)  Pt will perform LB dressing with I   (SVN - Setup)  Pt will transfer to toilet with I   (SVN - Mod I)  Pt will perform standing task x 5 min with SBA   (Partially met)                       Time Tracking:     OT Date of Treatment: 05/13/24  OT Start Time: 1015  OT Stop Time: 1057  OT Total Time (min): 42 min    Billable Minutes:Self Care/Home Management 15  Therapeutic Exercise 27    OT/SUSANNA: OT       5/13/2024

## 2024-05-13 NOTE — PT/OT/SLP PROGRESS
Physical Therapy Treatment    Patient Name:  Jeanie Lucas   MRN:  [id number]    Recommendations:     Discharge Recommendations: Low Intensity Therapy  Discharge Equipment Recommendations: rollator  Barriers to discharge: Decreased caregiver support    Assessment:     Jeanie Lucas is a 78 y.o. female admitted with a medical diagnosis of Hyperkalemia.  She presents with the following impairments/functional limitations: weakness, impaired endurance .        Rehab Prognosis: Good; patient would benefit from acute skilled PT services to address these deficits and reach maximum level of function.    Recent Surgery: * No surgery found *      Plan:     During this hospitalization, patient to be seen 5 x/week to address the identified rehab impairments via gait training, therapeutic exercises and progress toward the following goals:    Plan of Care Expires:       Subjective     Chief Complaint: no pain stated  Patient/Family Comments/goals: increased BLE strengthening and endurance  Pain/Comfort:  Pain Rating 1: 0/10  Pain Rating Post-Intervention 2: 0/10      Objective:     Communicated with pt prior to session.  Patient found sitting edge of bed with   upon PT entry to room.     General Precautions: Standard, fall  Orthopedic Precautions: N/A  Braces: N/A  Respiratory Status: Room air     Functional Mobility:  Transfers:     Sit to Stand:  contact guard assistance with rollator  Bed to Chair: contact guard assistance with  rollator  using  Step Transfer  Gait: Pt ambulated 350 ft SBA with pt rollator with PTA cues for standing posture      AM-PAC 6 CLICK MOBILITY  Turning over in bed (including adjusting bedclothes, sheets and blankets)?: 4  Sitting down on and standing up from a chair with arms (e.g., wheelchair, bedside commode, etc.): 4  Moving from lying on back to sitting on the side of the bed?: 4  Moving to and from a bed to a chair (including a wheelchair)?: 4  Need to walk in hospital room?: 3  Climbing 3-5 steps  with a railing?: 3  Basic Mobility Total Score: 22       Treatment & Education:  Pt performed sit/stand x 6 SBA with rollator, static standing 2-3 min, pt completed x 10 min seated NuStep to promote increased BLE knee flexion, extension and endurance with PTA cues for full ROM with BLEs.      Patient left up in chair with  GARBER present..    GOALS:   Multidisciplinary Problems       Physical Therapy Goals       Not on file                    Time Tracking:     PT Received On: 05/13/24  PT Start Time: 0945     PT Stop Time: 1013  PT Total Time (min): 28 min     Billable Minutes: Gait Training 15 and Therapeutic Exercise 13    Treatment Type: Treatment  PT/PTA: PTA     Number of PTA visits since last PT visit: 2     05/13/2024

## 2024-05-13 NOTE — PT/OT/SLP PROGRESS
Speech Language Pathology Treatment    Patient Name:  Jeanie Lucas   MRN:  [id number]  Admitting Diagnosis: Hyperkalemia    Recommendations:                 General Recommendations:  Cognitive-linguistic therapy  Diet recommendations:  Regular, Liquid Diet Level: Thin   Aspiration Precautions: Standard aspiration precautions   General Precautions: Standard, fall  Communication strategies:  none    Assessment:     Jeanie Lucas is a 78 y.o. female with an SLP diagnosis of Cognitive-Linguistic Impairment.  She presents with hospital admission and following SWB admission secondary to frequent falls, altered mental status due to metabolic encephalopathy.     Pt presents with change in cognitive status, increased falls, decreased safety awareness, decline in independence with ADLs    Pt with scheduled discharge this date, however due to change in medical status pt with delayed discharge until following week.        Subjective     Pt seen upright in chair in room  Patient goals: ready to get back home      Pain/Comfort:       Respiratory Status: Room air    Objective:     Has the patient been evaluated by SLP for swallowing?   Yes  Keep patient NPO? No   Current Respiratory Status:        Tasks completed for medication management and recall with verbal and written instructions given. Pt with 70% accuracy independently, 100% accuracy with cues for reference to written directions.   Task for recall simple details with 77% accuracy.       Goals:   Multidisciplinary Problems       SLP Goals          Problem: SLP    Goal Priority Disciplines Outcome   SLP Goal     SLP Progressing   Description: Short Term Goals:  Pt will demonstrate orientation to person, place, time with 60% accuracy.  Pt will recall 3 details with 60% accuracy.   Pt will demonstrate ID safe/unsafe situations with 60% accuracy.   Pt will demonstrate problem solving and reasoning skills with 60% accuracy.     Long Term Goals:  Pt will demonstrate orientation to  person, place, time with 80% accuracy.  Pt will recall 3 details with 80% accuracy.   Pt will demonstrate ID safe/unsafe situations with 80% accuracy.   Pt will demonstrate problem solving and reasoning skills with 80% accuracy.                        Plan:     Patient to be seen:  5 x/week   Plan of Care expires:  05/24/24  Plan of Care reviewed with:  patient   SLP Follow-Up:  Yes       Discharge recommendations:  Moderate Intensity Therapy   Barriers to Discharge:  Level of Skilled Assistance Needed currently, decline in cognition and ADLs    Time Tracking:     SLP Treatment Date:   05/13/24  Speech Start Time:  1055  Speech Stop Time:  1125     Speech Total Time (min):  30 min    Billable Minutes: Speech Therapy Individual 30    05/13/2024

## 2024-05-14 LAB
GLUCOSE SERPL-MCNC: 101 MG/DL (ref 70–105)
GLUCOSE SERPL-MCNC: 112 MG/DL (ref 70–105)
GLUCOSE SERPL-MCNC: 158 MG/DL (ref 70–105)
GLUCOSE SERPL-MCNC: 216 MG/DL (ref 70–105)

## 2024-05-14 PROCEDURE — 63600175 PHARM REV CODE 636 W HCPCS: Performed by: REGISTERED NURSE

## 2024-05-14 PROCEDURE — 25000003 PHARM REV CODE 250: Performed by: NURSE PRACTITIONER

## 2024-05-14 PROCEDURE — 97116 GAIT TRAINING THERAPY: CPT | Mod: CQ

## 2024-05-14 PROCEDURE — 25000003 PHARM REV CODE 250

## 2024-05-14 PROCEDURE — 63600175 PHARM REV CODE 636 W HCPCS: Performed by: FAMILY MEDICINE

## 2024-05-14 PROCEDURE — 11000004 HC SNF PRIVATE

## 2024-05-14 PROCEDURE — 82962 GLUCOSE BLOOD TEST: CPT

## 2024-05-14 PROCEDURE — 97530 THERAPEUTIC ACTIVITIES: CPT | Mod: CO

## 2024-05-14 PROCEDURE — 97110 THERAPEUTIC EXERCISES: CPT | Mod: CQ

## 2024-05-14 PROCEDURE — 92507 TX SP LANG VOICE COMM INDIV: CPT

## 2024-05-14 PROCEDURE — 97110 THERAPEUTIC EXERCISES: CPT | Mod: CO

## 2024-05-14 RX ADMIN — LEVOTHYROXINE SODIUM 25 MCG: 25 TABLET ORAL at 05:05

## 2024-05-14 RX ADMIN — SERTRALINE HYDROCHLORIDE 25 MG: 25 TABLET ORAL at 08:05

## 2024-05-14 RX ADMIN — ISOSORBIDE MONONITRATE 30 MG: 30 TABLET, EXTENDED RELEASE ORAL at 08:05

## 2024-05-14 RX ADMIN — ASPIRIN 81 MG: 81 TABLET, COATED ORAL at 08:05

## 2024-05-14 RX ADMIN — INSULIN GLARGINE 10 UNITS: 100 INJECTION, SOLUTION SUBCUTANEOUS at 08:05

## 2024-05-14 RX ADMIN — CARVEDILOL 12.5 MG: 6.25 TABLET, FILM COATED ORAL at 08:05

## 2024-05-14 RX ADMIN — LATANOPROST 1 DROP: 50 SOLUTION OPHTHALMIC at 08:05

## 2024-05-14 RX ADMIN — INSULIN ASPART 4 UNITS: 100 INJECTION, SOLUTION INTRAVENOUS; SUBCUTANEOUS at 04:05

## 2024-05-14 RX ADMIN — PANTOPRAZOLE SODIUM 40 MG: 40 TABLET, DELAYED RELEASE ORAL at 08:05

## 2024-05-14 RX ADMIN — SODIUM ZIRCONIUM CYCLOSILICATE 10 G: 5 POWDER, FOR SUSPENSION ORAL at 08:05

## 2024-05-14 RX ADMIN — ATORVASTATIN CALCIUM 80 MG: 40 TABLET, FILM COATED ORAL at 08:05

## 2024-05-14 NOTE — PT/OT/SLP PROGRESS
Occupational Therapy   Treatment    Name: Jeanie Lucas  MRN: [id number]  Admitting Diagnosis:  Hyperkalemia       Recommendations:     Discharge Recommendations: Low Intensity Therapy  Discharge Equipment Recommendations:  rollator  Barriers to discharge:       Assessment:     Jeanie Lucas is a 78 y.o. female with a medical diagnosis of Hyperkalemia.  She presents with the following Performance deficits affecting function are weakness, impaired endurance, impaired balance, decreased safety awareness.     Rehab Prognosis:  Good; patient would benefit from acute skilled OT services to address these deficits and reach maximum level of function.       Plan:     Patient to be seen 5 x/week to address the above listed problems via self-care/home management, therapeutic activities, therapeutic exercises  Plan of Care Expires: 05/17/24  Plan of Care Reviewed with: patient    Subjective   Patient was sitting up in her recliner chair in her room upon GARBER arrival. Patient stated she was feeling fine with no complaints of pain and was agreeable to participate in todays session.   Patient/Family Comments/goals: to increase UB strength and endurance in order to return home.   Pain/Comfort:  Pain Rating 1: 0/10    Objective:     Communicated with: nursing staff prior to session.  Patient found up in chair with peripheral IV upon OT entry to room.    General Precautions: Standard, fall    Orthopedic Precautions:N/A  Braces: N/A  Respiratory Status: Room air    Therapeutic exercises:  Patient completed the following exercises to work on UB strength in order to complete ADLs, bed mobility, and transfers with less assistance.    -Bicep curls: 3 sets of 10 with 2# dumbbell  -Chest press: 3 sets of 10 with 2# dumbbell  -Shoulder flex: 3 sets of 10 with 2# dumbbell  -Internal/External rotation: 3 sets of 10 with 2# dumbbell    Increased time/effort needed to complete each exercise.     Therapeutic activities:  Pt completed 10 sit to stand  transfers with supervision to work on tricep strength, stand tolerance and endurance in preparation to complete ADLs and transfers from different surfaces such as BSC, chair, etc. with less assistance. Increased time/effort needed.   While standing, pt instructed in several balance activities requiring pt to reach in all planes and moving in/out of midline to work on dynamic standing balance and endurance in order to complete ADLs standing at sink and being able to reach in cabinets with less assistance. Pt stood 3:35 minutes with Stand-by Assistance.    Patient left up in chair with  PTA present    GOALS:   Multidisciplinary Problems       Occupational Therapy Goals          Problem: Occupational Therapy    Goal Priority Disciplines Outcome Interventions   Occupational Therapy Goal     OT, PT/OT Progressing    Description: STG:  Pt will perform grooming with I  Pt will bathe self with SVN  (Goal met)  Pt will perform UB dressing with SVN   (Goal met)  Pt will perform LB dressing with SVN   (Goal met)  Pt will transfer to toilet with SBA  (Goal met)  Pt will perform standing task x 3 min with SBA  (Goal met)    LTG:  Pt will bathe self with I    (SVN - Setup)  Pt will perform UB dressing with I   (SVN - Setup)  Pt will perform LB dressing with I   (SVN - Setup)  Pt will transfer to toilet with I   (SVN - Mod I)  Pt will perform standing task x 5 min with SBA   (Partially met)                       Time Tracking:     OT Date of Treatment: 05/14/24  OT Start Time: 0955  OT Stop Time: 1023  OT Total Time (min): 28 min    Billable Minutes:Therapeutic Activity 14 minutes  Therapeutic Exercise 14 minutes    OT/SUSANNA: SUSANNA     Number of SUSANNA visits since last OT visit: 1    SUSANNA Galvan  5/14/2024  2:16 PM

## 2024-05-14 NOTE — PT/OT/SLP PROGRESS
Physical Therapy Treatment    Patient Name:  Jeanie Lucas   MRN:  [id number]    Recommendations:     Discharge Recommendations: Low Intensity Therapy  Discharge Equipment Recommendations: rollator  Barriers to discharge: Decreased caregiver support    Assessment:     Jeanie Lucas is a 78 y.o. female admitted with a medical diagnosis of Hyperkalemia.  She presents with the following impairments/functional limitations: weakness, impaired endurance .        Rehab Prognosis: Good; patient would benefit from acute skilled PT services to address these deficits and reach maximum level of function.    Recent Surgery: * No surgery found *      Plan:     During this hospitalization, patient to be seen 5 x/week to address the identified rehab impairments via gait training, therapeutic exercises and progress toward the following goals:    Plan of Care Expires:       Subjective     Chief Complaint: no pain stated  Patient/Family Comments/goals: increased BLE strengthening and endurance  Pain/Comfort:  Pain Rating 1: 0/10  Pain Rating Post-Intervention 2: 0/10      Objective:     Communicated with pt prior to session.  Patient found sitting edge of bed with   upon PT entry to room.     General Precautions: Standard, fall  Orthopedic Precautions: N/A  Braces: N/A  Respiratory Status: Room air     Functional Mobility:  Transfers:     Sit to Stand:  contact guard assistance with rollator  Bed to Chair: contact guard assistance with  rollator  using  Step Transfer  Gait: Pt ambulated 350 ft SBA with pt rollator with PTA cues for standing posture      AM-PAC 6 CLICK MOBILITY  Turning over in bed (including adjusting bedclothes, sheets and blankets)?: 4  Sitting down on and standing up from a chair with arms (e.g., wheelchair, bedside commode, etc.): 4  Moving from lying on back to sitting on the side of the bed?: 4  Moving to and from a bed to a chair (including a wheelchair)?: 4  Need to walk in hospital room?: 3  Climbing 3-5 steps  with a railing?: 3  Basic Mobility Total Score: 22       Treatment & Education:  Pt performed 3x10 of the following with 4#  Knee extension 1x10 with 20#  HS curls with 30# 1x10  Hip ADD/ABD  Marches        Patient left up in chair with call bell in hand.     GOALS:   Multidisciplinary Problems       Physical Therapy Goals       Not on file                    Time Tracking:     PT Received On: 05/14/24  PT Start Time: 1023     PT Stop Time: 1053  PT Total Time (min): 30 min     Billable Minutes: Gait Training 15 and Therapeutic Exercise 15    Treatment Type: Treatment  PT/PTA: PTA     Number of PTA visits since last PT visit: 3     05/14/2024

## 2024-05-14 NOTE — PLAN OF CARE
[facility]  Discharge Reassessment    Primary Care Provider: Arabella Ann FNP    Expected Discharge Date: 5/24/2024    Reassessment (most recent)       Discharge Reassessment - 05/14/24 1001          Discharge Reassessment    Assessment Type Discharge Planning Brief Assessment     Did the patient's condition or plan change since previous assessment? No     Discharge Plan discussed with: Adult children;Patient     Communicated JENNIE with patient/caregiver Date not available/Unable to determine     Discharge Plan A Home with family;Home Health     DME Needed Upon Discharge  none     Why the patient remains in the hospital Requires continued medical care        Post-Acute Status    Post-Acute Authorization Home Health     Home Health Status Referrals Sent     Coverage Medicare     Patient choice form signed by patient/caregiver List with quality metrics by geographic area provided     Discharge Delays Waiting for Provider to Speak to Patient                   Discharge postponed due to elevated K , waiting on provider to speak to pt and family , discharge plan is hoem with family and [facility] home [facility]

## 2024-05-14 NOTE — CONSULTS
[facility]  Adult Nutrition  Consult Note         Reason for Assessment  Reason For Assessment: consult swb admit  Nutrition Risk Screen: no indicators present    Assessment and Plan  5/14/2024 RD Follow up: Patient continues on an 1800 calorie consistent carb diet with Boost GC BID. PO intake varies with % intake at meals. Current weight 134 pounds. Unsure of accuracy as previous weight was 117 pounds. Recommend to continue current POC. RD Following.     5/7/2024 RD Follow up: Patient continues on an 1800 calorie consistent carb diet with Boost GC BID. PO intake is good with average of 75% of meals per flowsheets. Current weight 117 #. Diet adequate to meet estimated energy needs. Will f/u with patient today and make changes as needed. Patient denies issues with current diet at RD visit.  Continue current POC. RD Following.     Consult received and appreciated. Patient admitted on 5/2 with a dx of weakness, type 1 DM, dementia, heart failure and thyroid disease. Patient is ordered an 1800 calorie consistent carbohydrate diet. PO intake is good per flowsheets with % intake documented. Noted patient drinking ONS at times.     Patient is 53.4 kg with a BMI of 21.355 which is considered underweight per geriatric standards. Will provide additional calories with Boost Glucose Control BID. Encourage po intakes. RD will follow up with visit as appropriate on return to facility.       Learning Needs/Social Determinants of Health  Learning Assessment       05/02/2024 1728 [facility] (5/2/2024 - Present)   Created by Hortensia Douglass RN - RN (Nurse) Status: Complete                 PRIMARY LEARNER     Primary Learner Name:  latanya murphy CR - 05/02/2024 1728    Relationship:  Patient CR - 05/02/2024 1728    Does the primary learner have any barriers to learning?:  No Barriers CR - 05/02/2024 1728    What is the preferred language of the  primary learner?:  English  - 05/02/2024 1728    Is an  required?:  No  - 05/02/2024 1728    How does the primary learner prefer to learn new concepts?:  Listening  - 05/02/2024 1728    How often do you need to have someone help you read instructions, pamphlets, or written material from your doctor or pharmacy?:  Rarely CR - 05/02/2024 1728        CO-LEARNER #1     No question answered        CO-LEARNER #2     No question answered        SPECIAL TOPICS     No question answered        ANSWERED BY:     No question answered        Comments         Edit History       Hortensia Douglass RN - RN (Nurse)   05/02/2024 1728                          Social Determinants of Health     Tobacco Use: Low Risk  (9/13/2023)    Patient History     Smoking Tobacco Use: Never     Smokeless Tobacco Use: Never     Passive Exposure: Not on file   Alcohol Use: Not At Risk (5/3/2024)    AUDIT-C     Frequency of Alcohol Consumption: Never     Average Number of Drinks: Patient does not drink     Frequency of Binge Drinking: Never   Financial Resource Strain: Low Risk  (5/3/2024)    Overall Financial Resource Strain (CARDIA)     Difficulty of Paying Living Expenses: Not hard at all   Food Insecurity: No Food Insecurity (5/3/2024)    Hunger Vital Sign     Worried About Running Out of Food in the Last Year: Never true     Ran Out of Food in the Last Year: Never true   Transportation Needs: No Transportation Needs (5/3/2024)    PRAPARE - Transportation     Lack of Transportation (Medical): No     Lack of Transportation (Non-Medical): No   Physical Activity: Inactive (5/3/2024)    Exercise Vital Sign     Days of Exercise per Week: 0 days     Minutes of Exercise per Session: 0 min   Stress: No Stress Concern Present (5/3/2024)    [employer] [place] [facility] [employer] [facility] - Occupational Stress Questionnaire     Feeling of Stress : Not at all   Housing Stability: Not on file (5/3/2024)   Depression: Not on file   Utilities: Not on  file   Health Literacy: Not on file   Social Isolation: Not on file            Malnutrition  Is Patient Malnourished: No    Nutrition Diagnosis  Altered nutrition related laboratory values related to Diabetes Mellitus as evidenced by blood glucose levels elevated  Comments: on CCD    Recent Labs   Lab 05/13/24  0503 05/13/24  0529 05/14/24  0513   GLU 98  --   --    POCGLU  --    < > 101    < > = values in this interval not displayed.     Comments on Glucose: WNL; hx type 1 DM with hypoglycemia; noted insulin ordered once daily    Nutrition Prescription / Recommendations  Recommendation/Intervention: Continue current diet as ordered. Will order Boost Glucose Control (sub Glucerna) BID  Goals: continued good po intake of at least 75% during admission; weight maintenance during admission  Nutrition Goal Status: progressing towards goal  Current Diet Order: 1800 calorie consistent carbohydrate diet  Oral Nutrition Supplement: Boost GC BID regular diet rec per SLP  Chewing or Swallowing Difficulty?: No Chewing or swallowing difficulty  Recommended Diet: Consistent Carbohydrate 1800 (60g Carbs)  Recommended Oral Supplement: Boost Glucose Control [190kcals, 16g protein, 16g Carbs] 2 times a day  Is Nutrition Support Recommended: [facility] Nutrition Support: No  Is Nutrition Education Recommended: No    Monitor and Evaluation  % current Intake: P.O. intake of 50 - 75 % and P.O. intake of 75 - 100 %  % intake to meet estimated needs: 75 - 100 %  Food and Nutrient Intake: energy intake, food and beverage intake  Food and Nutrient Adminstration: diet order  Anthropometric Measurements: height/length, weight change, weight, body mass index  Biochemical Data, Medical Tests and Procedures: electrolyte and renal panel, gastrointestinal profile, glucose/endocrine profile, inflammatory profile  Energy Calories Required: meeting needs  Protein Required: meeting needs  Fluid Required: meeting needs  Tolerance:  "tolerating    Current Medical Diagnosis and Past Medical History     Past Medical History:   Diagnosis Date    Diabetes mellitus, type 2     Heart disease     Hypertension     Parkinson disease        Nutrition/Diet History  Spiritual, Cultural Beliefs, Taoism Practices, Values that Affect Care: no  Food Allergies: NKFA  Factors Affecting Nutritional Intake: None identified at this time    Lab/Procedures/Meds  Recent Labs   Lab 05/13/24  0503      K 6.0*   BUN 57*   CREATININE 3.00*   CALCIUM 8.2*   *     Bun/Cr elevated/K elevated  Last A1c: elevated  Lab Results   Component Value Date    HGBA1C 7.8 (H) 05/02/2024     Lab Results   Component Value Date    RBC 2.94 (L) 05/09/2024    HGB 8.7 (L) 05/09/2024    HCT 27.4 (L) 05/09/2024    MCV 93.2 05/09/2024    MCH 29.6 05/09/2024    MCHC 31.8 (L) 05/09/2024     Pertinent Labs Reviewed: reviewed  Pertinent Labs Comments: Glucose 186-384  Pertinent Medications Reviewed: reviewed  Scheduled Meds:   aspirin  81 mg Oral Daily    atorvastatin  80 mg Oral QHS    carvediloL  12.5 mg Oral BID    insulin glargine U-100  10 Units Subcutaneous Daily    isosorbide mononitrate  30 mg Oral Daily    latanoprost  1 drop Left Eye QHS    levothyroxine  25 mcg Oral Before breakfast    pantoprazole  40 mg Oral Daily    sertraline  25 mg Oral Daily     Continuous Infusions:      PRN Meds:.  Current Facility-Administered Medications:     acetaminophen, 650 mg, Oral, Q6H PRN    calcium carbonate, 500 mg, Oral, BID PRN    furosemide, 20 mg, Oral, Daily PRN    glucagon (human recombinant), 1 mg, Intramuscular, PRN    glucose, 16 g, Oral, PRN    glucose, 24 g, Oral, PRN    insulin aspart U-100, 0-10 Units, Subcutaneous, QID (AC + HS) PRN    melatonin, 6 mg, Oral, Nightly PRN    senna-docusate 8.6-50 mg, 1 tablet, Oral, BID PRN    Anthropometrics  Temp: 98.1 °F (36.7 °C)  Height: 5' 2" (157.5 cm)  Height (inches): 62 in  Weight Method: Bed Scale  Weight: 60.8 kg (134 lb)  Weight " (lb): 134 lb  Ideal Body Weight (IBW), Female: 110 lb  % Ideal Body Weight, Female (lb): 107.09 %  BMI (Calculated): 24.5       Estimated/Assessed Needs      Temp: 98.1 °F (36.7 °C)Oral  Weight Used For Calorie Calculations: 53.4 kg (117 lb 11.6 oz)   Energy Need Method: Kcal/kg Energy Calorie Requirements (kcal): 8413-7997  Weight Used For Protein Calculations: 53.4 kg (117 lb 11.6 oz)  Protein Requirements: 43-54       RDA Method (mL): 1335       Nutrition by Nursing  Diet/Nutrition Received: consistent carb/diabetic diet, supplemental drink  Intake (%): 100%  Diet/Feeding Assistance: none  Diet/Feeding Tolerance: fair, good  Last Bowel Movement: 05/13/24                Nutrition Follow-Up  RD Follow-up?: Yes      Nutrition Discharge Planning: Home with home health; patient would benefit from a liberalized diet on d/c with ONS             Available via Secure Chat

## 2024-05-14 NOTE — PLAN OF CARE
Problem: SLP  Goal: SLP Goal  Description: Short Term Goals:  Pt will demonstrate orientation to person, place, time with 60% accuracy. MET  Pt will recall 3 details with 60% accuracy. MET  Pt will demonstrate ID safe/unsafe situations with 60% accuracy. MET  Pt will demonstrate problem solving and reasoning skills with 60% accuracy. MEt    Long Term Goals:  Pt will demonstrate orientation to person, place, time with 80% accuracy. progressing  Pt will recall 3 details with 80% accuracy. progressing  Pt will demonstrate ID safe/unsafe situations with 80% accuracy. progressing  Pt will demonstrate problem solving and reasoning skills with 80% accuracy. progressing  Outcome: Progressing

## 2024-05-14 NOTE — PT/OT/SLP PROGRESS
Speech Language Pathology Treatment    Patient Name:  Jeanie Lucas   MRN:  [id number]  Admitting Diagnosis: Hyperkalemia    Recommendations:                 General Recommendations:  Cognitive-linguistic therapy  Diet recommendations:  Regular, Liquid Diet Level: Thin   Aspiration Precautions: Standard aspiration precautions   General Precautions: Standard, fall  Communication strategies:  none    Assessment:     Jeanie Lucas is a 78 y.o. female with an SLP diagnosis of Cognitive-Linguistic Impairment.  She presents with hospital admission and following SWB admission secondary to frequent falls, altered mental status due to metabolic encephalopathy.     Pt presents with change in cognitive status, increased falls, decreased safety awareness, decline in independence with ADLs    Pt with scheduled discharge this date, however due to change in medical status pt with delayed discharge until following week.        Subjective     Pt seen upright, dressed on couch within room.  Patient goals: ready to get back home      Pain/Comfort:       Respiratory Status: Room air    Objective:     Has the patient been evaluated by SLP for swallowing?   Yes  Keep patient NPO? No   Current Respiratory Status:        Tasks this date for increased problem solving and reasoning skills with 85% accuracy given model and verbal instructions.   Pt completed task for recall 1-2 details with 75% accuracy.  Pt oriented to person, place this date, mod cues for situation and time.       Goals:   Multidisciplinary Problems       SLP Goals          Problem: SLP    Goal Priority Disciplines Outcome   SLP Goal     SLP Progressing   Description: Short Term Goals:  Pt will demonstrate orientation to person, place, time with 60% accuracy. MET  Pt will recall 3 details with 60% accuracy. MET  Pt will demonstrate ID safe/unsafe situations with 60% accuracy. MET  Pt will demonstrate problem solving and reasoning skills with 60% accuracy. MEt    Long Term  Goals:  Pt will demonstrate orientation to person, place, time with 80% accuracy. progressing  Pt will recall 3 details with 80% accuracy. progressing  Pt will demonstrate ID safe/unsafe situations with 80% accuracy. progressing  Pt will demonstrate problem solving and reasoning skills with 80% accuracy. progressing                       Plan:     Patient to be seen:  5 x/week   Plan of Care expires:  05/24/24  Plan of Care reviewed with:  patient   SLP Follow-Up:  Yes       Discharge recommendations:  Moderate Intensity Therapy   Barriers to Discharge:  Level of Skilled Assistance Needed currently, decline in cognition and ADLs    Time Tracking:     SLP Treatment Date:   05/14/24  Speech Start Time:  0930  Speech Stop Time:  0958     Speech Total Time (min):  28 min    Billable Minutes: Speech Therapy Individual 28    05/14/2024

## 2024-05-15 LAB
ANION GAP SERPL CALCULATED.3IONS-SCNC: 16 MMOL/L (ref 7–16)
BUN SERPL-MCNC: 52 MG/DL (ref 7–18)
BUN/CREAT SERPL: 19 (ref 6–20)
CALCIUM SERPL-MCNC: 8.1 MG/DL (ref 8.5–10.1)
CHLORIDE SERPL-SCNC: 114 MMOL/L (ref 98–107)
CO2 SERPL-SCNC: 19 MMOL/L (ref 21–32)
CREAT SERPL-MCNC: 2.69 MG/DL (ref 0.55–1.02)
EGFR (NO RACE VARIABLE) (RUSH/TITUS): 18 ML/MIN/1.73M2
GLUCOSE SERPL-MCNC: 141 MG/DL (ref 70–105)
GLUCOSE SERPL-MCNC: 147 MG/DL (ref 70–105)
GLUCOSE SERPL-MCNC: 171 MG/DL (ref 70–105)
GLUCOSE SERPL-MCNC: 64 MG/DL (ref 74–106)
POTASSIUM SERPL-SCNC: 4.7 MMOL/L (ref 3.5–5.1)
SODIUM SERPL-SCNC: 144 MMOL/L (ref 136–145)

## 2024-05-15 PROCEDURE — 25000003 PHARM REV CODE 250

## 2024-05-15 PROCEDURE — 11000004 HC SNF PRIVATE

## 2024-05-15 PROCEDURE — 63600175 PHARM REV CODE 636 W HCPCS: Performed by: REGISTERED NURSE

## 2024-05-15 PROCEDURE — 36415 COLL VENOUS BLD VENIPUNCTURE: CPT | Performed by: FAMILY MEDICINE

## 2024-05-15 PROCEDURE — 92507 TX SP LANG VOICE COMM INDIV: CPT

## 2024-05-15 PROCEDURE — 63600175 PHARM REV CODE 636 W HCPCS: Performed by: FAMILY MEDICINE

## 2024-05-15 PROCEDURE — 82962 GLUCOSE BLOOD TEST: CPT

## 2024-05-15 PROCEDURE — 25000003 PHARM REV CODE 250: Performed by: FAMILY MEDICINE

## 2024-05-15 PROCEDURE — 80048 BASIC METABOLIC PNL TOTAL CA: CPT | Performed by: FAMILY MEDICINE

## 2024-05-15 RX ADMIN — LATANOPROST 1 DROP: 50 SOLUTION OPHTHALMIC at 09:05

## 2024-05-15 RX ADMIN — CARVEDILOL 12.5 MG: 6.25 TABLET, FILM COATED ORAL at 08:05

## 2024-05-15 RX ADMIN — ATORVASTATIN CALCIUM 80 MG: 40 TABLET, FILM COATED ORAL at 08:05

## 2024-05-15 RX ADMIN — INSULIN ASPART 2 UNITS: 100 INJECTION, SOLUTION INTRAVENOUS; SUBCUTANEOUS at 04:05

## 2024-05-15 RX ADMIN — LEVOTHYROXINE SODIUM 25 MCG: 25 TABLET ORAL at 05:05

## 2024-05-15 RX ADMIN — INSULIN GLARGINE 10 UNITS: 100 INJECTION, SOLUTION SUBCUTANEOUS at 08:05

## 2024-05-15 RX ADMIN — SERTRALINE HYDROCHLORIDE 25 MG: 25 TABLET ORAL at 08:05

## 2024-05-15 RX ADMIN — SODIUM ZIRCONIUM CYCLOSILICATE 5 G: 5 POWDER, FOR SUSPENSION ORAL at 08:05

## 2024-05-15 RX ADMIN — ISOSORBIDE MONONITRATE 30 MG: 30 TABLET, EXTENDED RELEASE ORAL at 08:05

## 2024-05-15 RX ADMIN — PANTOPRAZOLE SODIUM 40 MG: 40 TABLET, DELAYED RELEASE ORAL at 08:05

## 2024-05-15 RX ADMIN — ASPIRIN 81 MG: 81 TABLET, COATED ORAL at 08:05

## 2024-05-15 NOTE — PLAN OF CARE
[facility] - Medical Surgical Unit  Discharge Final Note    Primary Care Provider: Arabella Ann FNP    Expected Discharge Date: 5/17/2024    Final Discharge Note (most recent)       Final Note - 05/15/24 1301          Final Note    Assessment Type Final Discharge Note     Anticipated Discharge Disposition Home-Health Care [facility]     What phone number can be called within the next 1-3 days to see how you are doing after discharge? [phone]        Post-Acute Status    Post-Acute Authorization Home Health     Home Health Status Referrals Sent     Coverage Medicare     Patient choice form signed by patient/caregiver List with quality metrics by geographic area provided     Discharge Delays None known at this time                     Important Message from Medicare  Important Message from Medicare regarding Discharge Appeal Rights: Given to patient/caregiver, Explained to patient/caregiver, Signed/date by patient/caregiver     Date IMM was signed: 05/09/24  Time IMM was signed: 1520    Contact Info       Richadr Harrison MD Nephrologist    Internal Medicine  [address] [place] [address] [id number]  [phone]       Next Steps: Follow up on 6/25/2024    Instructions: follow up:6- @ 9:00 for lab,10:00 for appt.          Discussed discharge with Caridad pt's daughter and planned for Friday now that k is down , she will have a follow up with Dr Harrison that is very important, [facility] , referral set again with updates .

## 2024-05-15 NOTE — PT/OT/SLP PROGRESS
Physical Therapy      Patient Name:  Jeanie Lucas   MRN:  [id number]    Patient not seen today secondary to Patient unwilling to participate, stating that she doesn't feel well today.  Will follow-up 5/16/2024.

## 2024-05-15 NOTE — PT/OT/SLP PROGRESS
Occupational Therapy      Patient Name:  Jeanie Lucas   MRN:  [id number]    Patient not seen today secondary to Patient unwilling to participate. Patient stated I just dont feel up to it today I dont feel my best. After encouragement, patient refused todays session. Will follow-up 5/16/2024.    SUSANNA Galvan  5/15/2024  11:24 AM

## 2024-05-15 NOTE — PT/OT/SLP PROGRESS
"Speech Language Pathology Treatment    Patient Name:  Jeanie Lucas   MRN:  [id number]  Admitting Diagnosis: Hyperkalemia    Recommendations:                 General Recommendations:  Cognitive-linguistic therapy  Diet recommendations:  Regular, Liquid Diet Level: Thin   Aspiration Precautions: Standard aspiration precautions   General Precautions: Standard, fall  Communication strategies:  none    Assessment:     Jeanie Lucas is a 78 y.o. female with an SLP diagnosis of Cognitive-Linguistic Impairment.  She presents with hospital admission and following SWB admission secondary to frequent falls, altered mental status due to metabolic encephalopathy.     Pt presents with change in cognitive status, increased falls, decreased safety awareness, decline in independence with ADLs    Pt with scheduled discharge this date, however due to change in medical status pt with delayed discharge until following week.        Subjective     Pt seen upright in chair immediately post breakfast meal.     Pt required max encouragement and explanation regarding hospitalization, need for therapy and daily schedule. Pt willing to participate, however pt replied multiple times throughout session, "I'm just doing what yall say, I have no choice." ST redirected and edu each time.     Patient goals: ready to get back home      Pain/Comfort:       Respiratory Status: Room air    Objective:     Has the patient been evaluated by SLP for swallowing?   Yes  Keep patient NPO? No   Current Respiratory Status:        Tasks this date for recall 3 details x8 trials with 90% accuracy, repetition x2.   Tasks for recall 4 details utilizing visual stimuli and multistimulation task x10 trials with 80% accuracy. Noted increased assistance at latter end of session. Pt with decreased attention and indicated, " I think I'm done doing this." ST discontinued session.      Goals:   Multidisciplinary Problems       SLP Goals          Problem: SLP    Goal Priority " Disciplines Outcome   SLP Goal     SLP Progressing   Description: Short Term Goals:  Pt will demonstrate orientation to person, place, time with 60% accuracy. MET  Pt will recall 3 details with 60% accuracy. MET  Pt will demonstrate ID safe/unsafe situations with 60% accuracy. MET  Pt will demonstrate problem solving and reasoning skills with 60% accuracy. MEt    Long Term Goals:  Pt will demonstrate orientation to person, place, time with 80% accuracy. progressing  Pt will recall 3 details with 80% accuracy. progressing  Pt will demonstrate ID safe/unsafe situations with 80% accuracy. progressing  Pt will demonstrate problem solving and reasoning skills with 80% accuracy. progressing                       Plan:     Patient to be seen:  5 x/week   Plan of Care expires:  05/24/24  Plan of Care reviewed with:  patient   SLP Follow-Up:  Yes       Discharge recommendations:  Moderate Intensity Therapy   Barriers to Discharge:  Level of Skilled Assistance Needed currently, decline in cognition and ADLs    Time Tracking:     SLP Treatment Date:   05/15/24  Speech Start Time:  0810  Speech Stop Time:  0835     Speech Total Time (min):  25 min    Billable Minutes: Speech Therapy Individual 25    05/15/2024

## 2024-05-15 NOTE — CARE UPDATE
[facility] - Medical Surgical Unit - Swing Bed   Interdisciplinary Team Meeting    Patient: Jeanie Lucas   Today's Date: 5/15/2024   Estimated D/C Date: 5/24/2024        Physician:  dr monster koo  Nurse Practitioner:  malachi rodas    Pharmacy:  [facility] Unit Director: Phuong Nolasco   : Debbi Cross Physical/Occupational Therapy: Solomon Cadena   Speech Therapy:  Justine Dewitt Activity Therapy: debbi rosales   Nursing: Phuong Nolasco  Respiratory: Beatriz Saeed Dietary:  lowell ash  Other: na     Nurse  New Symptoms/Problems: na  Last Bowel Movement: 05/12/24   Urine: continent  Jewell: No  Bowel: continent   Constipated: No  Diarrhea: No   Isolation: No  Wound Care: No  Wound Location/Tx: na  Cognition:  memory issues   Aspiration Precautions: No  Comment(s): na    Respiratory   O2 Device: Room Air  O2 Flow: 0  SpO2: 96%  Neb Tx: No  Comment(s): na     Dietary  Nutrition: Diabetic  Comment(s): 1800 ADA INTAKE 50-75%    Speech Therapy  Speech/Swallowing: No current speech or swallowing issues  Comment(s): NA    Physical Therapy  Gait/Assistive Device: 350FT+ WITH ROLATOR ELOS: Plan to DC 5/24/2024    Transfers: Independent  Bed Mobility: Independent Range of Motion/Restrictions: WNL  Comment(s): NA     Occupational Therapy  Eating/Grooming: Independent Toileting: Independent   Bathing: Independent Dressing (Upper Body): Independent   Dressing (Lower Body): Independent Comment(s): NA     Pharmacy  Medication Changes (see all MD orders in chart): Yes  Labs Reviewed: Yes  New Lab Orders: Yes  Comment(s): Reordered BMP and started Lokelma 5 gm daily       Tx Plan/Recommendations reviewed with family and/or patient on (date) 051524.  Additional family Conference/Training: na  D/C Plan/Recommendations: Home with HH and Home with family  JENNIE: 5/24/2024  Comment(s): discharged palnned for Friday

## 2024-05-15 NOTE — PLAN OF CARE
Care plan reviewed  Problem: Skin Injury Risk Increased  Goal: Skin Health and Integrity  Outcome: Progressing     Problem: Fall Injury Risk  Goal: Absence of Fall and Fall-Related Injury  Outcome: Progressing

## 2024-05-16 LAB
GLUCOSE SERPL-MCNC: 132 MG/DL (ref 70–105)
GLUCOSE SERPL-MCNC: 167 MG/DL (ref 70–105)
GLUCOSE SERPL-MCNC: 58 MG/DL (ref 70–105)
GLUCOSE SERPL-MCNC: 82 MG/DL (ref 70–105)

## 2024-05-16 PROCEDURE — 99315 NF DSCHRG MGMT 30 MIN/LESS: CPT | Mod: ,,, | Performed by: NURSE PRACTITIONER

## 2024-05-16 PROCEDURE — 97116 GAIT TRAINING THERAPY: CPT | Mod: CQ

## 2024-05-16 PROCEDURE — 92507 TX SP LANG VOICE COMM INDIV: CPT

## 2024-05-16 PROCEDURE — 97110 THERAPEUTIC EXERCISES: CPT

## 2024-05-16 PROCEDURE — 97110 THERAPEUTIC EXERCISES: CPT | Mod: CQ

## 2024-05-16 PROCEDURE — 63600175 PHARM REV CODE 636 W HCPCS: Performed by: FAMILY MEDICINE

## 2024-05-16 PROCEDURE — 25000003 PHARM REV CODE 250

## 2024-05-16 PROCEDURE — 82962 GLUCOSE BLOOD TEST: CPT

## 2024-05-16 PROCEDURE — 25000003 PHARM REV CODE 250: Performed by: FAMILY MEDICINE

## 2024-05-16 PROCEDURE — 11000004 HC SNF PRIVATE

## 2024-05-16 RX ADMIN — ATORVASTATIN CALCIUM 80 MG: 40 TABLET, FILM COATED ORAL at 09:05

## 2024-05-16 RX ADMIN — ISOSORBIDE MONONITRATE 30 MG: 30 TABLET, EXTENDED RELEASE ORAL at 08:05

## 2024-05-16 RX ADMIN — CARVEDILOL 12.5 MG: 6.25 TABLET, FILM COATED ORAL at 08:05

## 2024-05-16 RX ADMIN — LATANOPROST 1 DROP: 50 SOLUTION OPHTHALMIC at 09:05

## 2024-05-16 RX ADMIN — PANTOPRAZOLE SODIUM 40 MG: 40 TABLET, DELAYED RELEASE ORAL at 08:05

## 2024-05-16 RX ADMIN — ASPIRIN 81 MG: 81 TABLET, COATED ORAL at 08:05

## 2024-05-16 RX ADMIN — INSULIN GLARGINE 10 UNITS: 100 INJECTION, SOLUTION SUBCUTANEOUS at 08:05

## 2024-05-16 RX ADMIN — CARVEDILOL 12.5 MG: 6.25 TABLET, FILM COATED ORAL at 09:05

## 2024-05-16 RX ADMIN — LEVOTHYROXINE SODIUM 25 MCG: 25 TABLET ORAL at 05:05

## 2024-05-16 RX ADMIN — SERTRALINE HYDROCHLORIDE 25 MG: 25 TABLET ORAL at 08:05

## 2024-05-16 RX ADMIN — SODIUM ZIRCONIUM CYCLOSILICATE 5 G: 5 POWDER, FOR SUSPENSION ORAL at 10:05

## 2024-05-16 NOTE — ASSESSMENT & PLAN NOTE
05/16/2024: Potassium 4.7 on 05/15. Insurance will not pay for Lokelma.   -Veltassa 8.4g one packet daily until your follow up with Dr Harrison  -Follow up with PCP in one week for recheck on potassium  -Keep appointment with Dr. Harrison as scheduled for 06/25 at 09:00 am for lab work and 10:00 am for appointment.

## 2024-05-16 NOTE — PT/OT/SLP PROGRESS
Speech Language Pathology Treatment    Patient Name:  Jeanie Lucas   MRN:  [id number]  Admitting Diagnosis: Hyperkalemia    Recommendations:                 General Recommendations:  Cognitive-linguistic therapy  Diet recommendations:  Regular, Liquid Diet Level: Thin   Aspiration Precautions: Standard aspiration precautions   General Precautions: Standard, fall  Communication strategies:  none    Assessment:     Jeanie Lucas is a 78 y.o. female with an SLP diagnosis of Cognitive-Linguistic Impairment.  She presents with hospital admission and following SWB admission secondary to frequent falls, altered mental status due to metabolic encephalopathy.     Pt presents with change in cognitive status, increased falls, decreased safety awareness, decline in independence with ADLs    Pt with scheduled discharge this date, however due to change in medical status pt with delayed discharge until following week.        Subjective     Pt in room post noon meal.    Patient goals: ready to get back home      Pain/Comfort:       Respiratory Status: Room air    Objective:     Has the patient been evaluated by SLP for swallowing?   Yes  Keep patient NPO? No   Current Respiratory Status:        Upon entrance to room, pt noted standing at sink without rollator and chair alarm disarmed on pt bed.   ST [facility] for retrieval of rollator to ensure safe movement within the room, also call light usage for nurse to assist pt to prevent fall.     Pt completed hands on task for determining safe and unsafe situations with 85% accuracy. Pt completed task for how the scenarios related to her since hospitalization, pt with lack of insight into safety when pertaining to self in 7/10 trials.      Goals:   Multidisciplinary Problems       SLP Goals          Problem: SLP    Goal Priority Disciplines Outcome   SLP Goal     SLP Progressing   Description: Short Term Goals:  Pt will demonstrate orientation to person, place, time with 60% accuracy. MET  Pt will  recall 3 details with 60% accuracy. MET  Pt will demonstrate ID safe/unsafe situations with 60% accuracy. MET  Pt will demonstrate problem solving and reasoning skills with 60% accuracy. MEt    Long Term Goals:  Pt will demonstrate orientation to person, place, time with 80% accuracy. progressing  Pt will recall 3 details with 80% accuracy. progressing  Pt will demonstrate ID safe/unsafe situations with 80% accuracy. progressing  Pt will demonstrate problem solving and reasoning skills with 80% accuracy. progressing                       Plan:     Patient to be seen:  5 x/week   Plan of Care expires:  05/24/24  Plan of Care reviewed with:  patient   SLP Follow-Up:  Yes       Discharge recommendations:  Moderate Intensity Therapy   Barriers to Discharge:  Level of Skilled Assistance Needed currently, decline in cognition and ADLs    Time Tracking:     SLP Treatment Date:   05/16/24  Speech Start Time:  1148  Speech Stop Time:  1208     Speech Total Time (min):  20 min    Billable Minutes: Speech Therapy Individual 20    05/16/2024

## 2024-05-16 NOTE — ASSESSMENT & PLAN NOTE
05/16/2024: Potassium 4.7 on 05/15.   -Continue Lokelma 5 mg daily until your follow up with Dr Harrison  -Keep appointment with Dr. Harrison as scheduled for 06/25 at 09:00 am for lab work and 10:00 am for appointment.

## 2024-05-16 NOTE — PT/OT/SLP PROGRESS
Occupational Therapy   Treatment    Name: Jeanie Lucas  MRN: [id number]  Admitting Diagnosis:  Hyperkalemia       Recommendations:     Discharge Recommendations: Low Intensity Therapy  Discharge Equipment Recommendations:  rollator  Barriers to discharge:  Decreased caregiver support    Assessment:     Jeanie Lucas is a 78 y.o. female with a medical diagnosis of Hyperkalemia.  She presents with the following performance deficits affecting function are weakness, impaired endurance, impaired balance, decreased safety awareness.   Patient has progressed well during SWB rehab and is able to return home at this time with family and supervision.     Rehab Prognosis:  Good; patient would benefit from acute skilled OT services to address these deficits and reach maximum level of function.       Plan:     Patient to be seen 5 x/week to address the above listed problems via self-care/home management, therapeutic activities, therapeutic exercises  Plan of Care Expires: 05/17/24  Plan of Care Reviewed with: patient    Subjective     Chief Complaint: none  Patient/Family Comments/goals: pt goal to return home  Pain/Comfort:   None     Objective:     Communicated with: RN prior to session.  Patient found up in chair upon OT entry to room.    General Precautions: Standard, fall    Orthopedic Precautions:N/A  Braces: N/A  Respiratory Status: Room air     Occupational Performance:     Functional Mobility/Transfers:  Patient completed Sit <> Stand Transfer with stand by assistance  with  4 wheeled walker   Patient completed Bed <> Chair Transfer using Stand Pivot technique with stand by assistance with 4 wheeled walker    Treatment & Education:  Patient completed the following exercises to work on UB strength in order to complete ADLs, bed mobility, and transfers with less assistance.  -Bicep curls: 2 sets of 10 with 2# dumbbell  -Chest press: 2 sets of 10 with 2# dumbbell  -Shoulder flex: 2 sets of 10 with 2#  dumbbell  -Internal/External rotation: 2 sets of 10 with 2# dumbbell  -Shoulder rows: 2 sets of 15 with green theraband  -Shoulder horiz abduction: 2 sets of 15 with red theraband    Patient completed 8 minutes on UBE ergometer on level 1 with no rest breaks needed, to work on UB strength and endurance in order to complete ADLs and transfers with less assist.       Patient left up in chair with  PTA present    GOALS:   Multidisciplinary Problems       Occupational Therapy Goals          Problem: Occupational Therapy    Goal Priority Disciplines Outcome Interventions   Occupational Therapy Goal     OT, PT/OT Progressing    Description: STG:  Pt will perform grooming with I  Pt will bathe self with SVN  (Goal met)  Pt will perform UB dressing with SVN   (Goal met)  Pt will perform LB dressing with SVN   (Goal met)  Pt will transfer to toilet with SBA  (Goal met)  Pt will perform standing task x 3 min with SBA  (Goal met)    LTG:  Pt will bathe self with I    (SVN - Setup)  Pt will perform UB dressing with I   (SVN - Setup)  Pt will perform LB dressing with I   (SVN - Setup)  Pt will transfer to toilet with I   (SVN - Mod I)  Pt will perform standing task x 5 min with SBA   (Partially met)                       Time Tracking:     OT Date of Treatment: 05/16/24  OT Start Time: 0920  OT Stop Time: 0951  OT Total Time (min): 31 min    Billable Minutes:Therapeutic Exercise 31    OT/SUSANNA: OT       5/16/2024

## 2024-05-16 NOTE — ASSESSMENT & PLAN NOTE
05/16/2024: Patient's FSGs are controlled on current medication regimen.  Last A1c reviewed-   Lab Results   Component Value Date    HGBA1C 7.8 (H) 05/02/2024     -Continue Lantus 10 units sc daily   -Monitor blood sugar readings and record. Take record of reading to your primary care provider

## 2024-05-16 NOTE — PT/OT/SLP PROGRESS
Physical Therapy Treatment    Patient Name:  Jeanie Lucas   MRN:  [id number]    Recommendations:     Discharge Recommendations: Low Intensity Therapy  Discharge Equipment Recommendations: rollator  Barriers to discharge: Decreased caregiver support    Assessment:     Jeanie Lucas is a 78 y.o. female admitted with a medical diagnosis of Hyperkalemia.  She presents with the following impairments/functional limitations: weakness, impaired endurance .        Rehab Prognosis: Good; patient would benefit from acute skilled PT services to address these deficits and reach maximum level of function.    Recent Surgery: * No surgery found *      Plan:     During this hospitalization, patient to be seen 5 x/week to address the identified rehab impairments via gait training, therapeutic exercises and progress toward the following goals:    Plan of Care Expires:       Subjective     Chief Complaint: no pain stated  Patient/Family Comments/goals: increased BLE strengthening and endurance  Pain/Comfort:  Pain Rating 1: 0/10  Pain Rating Post-Intervention 1: 0/10  Pain Rating Post-Intervention 2: 0/10      Objective:     Communicated with pt prior to session.  Patient found sitting edge of bed with   upon PT entry to room.     General Precautions: Standard, fall  Orthopedic Precautions: N/A  Braces: N/A  Respiratory Status: Room air     Functional Mobility:  Transfers:     Sit to Stand:  contact guard assistance with rollator  Bed to Chair: contact guard assistance with  rollator  using  Step Transfer  Gait: Pt ambulated 300 ft SBA with pt rollator with PTA cues for standing posture      AM-PAC 6 CLICK MOBILITY          Treatment & Education:  Pt performed 3x10 of the following with 4#  LAQs  Hip ADD/ABD with 3#  Marches  Pt completed x 10 min seated NuStep to promote increased BLE strengthening and endurance      Patient left up in chair with call bell in hand.     GOALS:   Multidisciplinary Problems       Physical Therapy Goals        Not on file                    Time Tracking:     PT Received On: 05/16/24  PT Start Time: 1045     PT Stop Time: 1115  PT Total Time (min): 30 min     Billable Minutes: Gait Training 10 and Therapeutic Exercise 20    Treatment Type: Treatment  PT/PTA: PTA     Number of PTA visits since last PT visit: 4     05/16/2024

## 2024-05-16 NOTE — ASSESSMENT & PLAN NOTE
05/16/2024: Patient ambulating 300 ft SBA with rollator. Has worked well with PT/OT.  -Discharge home in the morning with [facility].

## 2024-05-16 NOTE — DISCHARGE SUMMARY
[facility] - Medical Surgical Unit  Hospital Medicine  Discharge Summary      Patient Name: Jeanie Lucas  MRN: [id number]  KAUSHIK: [id number]  Patient Class: IP- Swing  Admission Date: 5/2/2024  Hospital Length of Stay: 14 days  Discharge Date and Time:  05/17/2024  Attending Physician: Jose Calzada Jr., MD   Discharging Provider: MARIO Self  Primary Care Provider: Arabella Ann FNP    Primary Care Team: Networked reference to record PCT     HPI:   Mrs. Lucas is a very pleasant 77 y/o AAF with a PMHx significant for HTN, Thyroid Disease, Dementia, Combined Systolic and Diastolic Heart Failure, GERD, Hyperlipidemia, Depression, and Type 1 DM presented to the Emergency Department at [facility] in [place] for frequent falls and altered mental status. CT Head w/o contrast was negative for any acute intracranial process and patient was subsequently diagnosed with metabolic encephalopathy related to intermittent hypoglycemia along with dementia.    On interview she seems lucid.  Feeling somewhat weak overall.  Looking forward to Rehab services.     * No surgery found *      Hospital Course:   05/09/2024: Patient is SWB day #8 for PT/OT rehabilitation for weakness. Patient ambulated 275 feet with rollator today. Labs today patient was found to have K+7.1, Cl 109, CO2 16, BUN/CR 90/3.95, glucose 178 mg/dL. EKG done with no peaked T waves, P waves present, QRS interval at 150. Spoke with Dr. Calzada regarding patient. Patient was given Calcium Gluconate 1 gm, Sodium Bicarb x 2 amps, started Sodium Bicarb infusion at 75 ml/hr, Regular insulin 5 units, D50 x1, Kayexalate and albuterol neb. Patient's discharge for 05/10 has been canceled.   Repeat BMP at 19:20 was K+ 6.5, Cl 11, CO2 14, BUN/CR 85/4.0, glucose 290 mg/dL. VS stable. Spoke with Dr. Henderson, hospitalist at [facility] and orders received.     05/16/2024: SWB day # 15 for PT/OT rehabilitation. Patient is currently ambulating 300 ft with  ruma. Patient is currently on Lokelma 5 mg daily. Potassium is down to 4.7, BUN/CR 52/2.69 which is at baseline. Plan to discharge home tomorrow with [facility].      Goals of Care Treatment Preferences:  Code Status: Full Code      Consults:   Consults (From admission, onward)          Status Ordering Provider     Inpatient consult to Registered Dietitian/Nutritionist  Once        Provider:  (Not yet assigned)    Completed LUC ABEL            Cardiac/Vascular  Primary hypertension  05/16/2024: Chronic, controlled. Latest blood pressure and vitals reviewed-     Temp:  [98 °F (36.7 °C)-98.2 °F (36.8 °C)]   Pulse:  [67-72]   Resp:  [18-20]   BP: (130-155)/(72-78)   SpO2:  [96 %-100 %] .   Home meds for hypertension were reviewed and noted below.   Hypertension Medications               carvediloL (COREG) 12.5 MG tablet Take 12.5 mg by mouth 2 (two) times daily.    furosemide (LASIX) 20 MG tablet Take 20 mg by mouth daily as needed (lower extremity edema).    isosorbide mononitrate (IMDUR) 30 MG 24 hr tablet Take 30 mg by mouth once daily.     -Continue with the above home blood pressure medications as prescribed.   -Follow up with primary care provider as scheduled      Renal/  * Hyperkalemia  05/16/2024: Potassium 4.7 on 05/15.   -Continue Lokelma 5 mg daily until your follow up with Dr Harrison  -Keep appointment with Dr. Harrison as scheduled for 06/25 at 09:00 am for lab work and 10:00 am for appointment.        Endocrine  Type 1 diabetes mellitus with hypoglycemia  05/16/2024: Patient's FSGs are controlled on current medication regimen.  Last A1c reviewed-   Lab Results   Component Value Date    HGBA1C 7.8 (H) 05/02/2024     -Continue Lantus 10 units sc daily   -Monitor blood sugar readings and record. Take record of reading to your primary care provider    Thyroid disease  Continue home dose synthroid 25 mcg daily    GI  Gastroesophageal reflux disease without esophagitis  Protonix 40mg  daily    Other  Weakness  05/16/2024: Patient ambulating 300 ft SBA with rollator. Has worked well with PT/OT.  -Discharge home in the morning with [facility].      Final Active Diagnoses:    Diagnosis Date Noted POA    PRINCIPAL PROBLEM:  Hyperkalemia [E87.5] 05/09/2024 No    Weakness [R53.1] 05/02/2024 Yes    Primary hypertension [I10] 05/02/2024 Yes    Thyroid disease [E07.9] 05/02/2024 Yes    Type 1 diabetes mellitus with hypoglycemia [E10.649] 05/02/2024 Yes    Gastroesophageal reflux disease without esophagitis [K21.9] 05/02/2024 Yes    Hyperlipidemia [E78.5] 05/02/2024 Yes    Recurrent major depressive disorder, in remission [F33.40] 05/02/2024 Yes      Problems Resolved During this Admission:    Diagnosis Date Noted Date Resolved POA    DM (diabetes mellitus), type 1 [E10.9] 05/02/2024 05/02/2024 Yes       Discharged Condition: fair    Disposition: Home-Health Care [facility]    Follow Up:   Follow-up Information       Richard Harrison MD Nephrologist Follow up on 6/25/2024.    Why: follow up:6- @ 9:00 for lab,10:00 for appt.  Contact information:  Internal Medicine  [address] [place] [id number] [phone]                         Patient Instructions:      Diet diabetic     Activity as tolerated       Significant Diagnostic Studies: Labs: All labs within the past 24 hours have been reviewed    Pending Diagnostic Studies:       None           Medications:  Reconciled Home Medications:      Medication List        START taking these medications      sodium zirconium cyclosilicate 5 gram packet  Commonly known as: LOKELMA  Take 1 packet (5 g total) by mouth once daily. Mix entire contents of packet(s) into drinking glass containing 3 tablespoons of water; stir well and drink immediately. Add water and repeat until no powder remains to receive entire dose.  Start taking on: May 17, 2024            CONTINUE taking these medications      aspirin 81 MG EC tablet  Commonly known as: ECOTRIN  Take 81 mg  by mouth once daily.     carvediloL 12.5 MG tablet  Commonly known as: COREG  Take 12.5 mg by mouth 2 (two) times daily.     furosemide 20 MG tablet  Commonly known as: LASIX  Take 20 mg by mouth daily as needed (lower extremity edema).     isosorbide mononitrate 30 MG 24 hr tablet  Commonly known as: IMDUR  Take 30 mg by mouth once daily.     LANTUS U-100 INSULIN 100 unit/mL injection  Generic drug: insulin glargine U-100 (Lantus)  Inject 10 Units into the skin once daily.     levothyroxine 25 MCG tablet  Commonly known as: SYNTHROID  Take 25 mcg by mouth before breakfast.     LUMIGAN 0.01 % Drop  Generic drug: bimatoprost  Place 1 drop into the left eye every evening.     pantoprazole 40 MG tablet  Commonly known as: PROTONIX  Take 40 mg by mouth once daily.     rosuvastatin 40 MG Tab  Commonly known as: CRESTOR  Take 40 mg by mouth every evening.     sertraline 100 MG tablet  Commonly known as: ZOLOFT  Take 25 mg by mouth once daily.            STOP taking these medications      spironolactone 25 MG tablet  Commonly known as: ALDACTONE              Indwelling Lines/Drains at time of discharge:   Lines/Drains/Airways       None                   Time spent on the discharge of patient: 30 minutes         MARIO Self  Department of Hospital Medicine  [facility] - Medical Surgical Unit

## 2024-05-16 NOTE — ASSESSMENT & PLAN NOTE
05/16/2024: Chronic, controlled. Latest blood pressure and vitals reviewed-     Temp:  [98 °F (36.7 °C)-98.2 °F (36.8 °C)]   Pulse:  [67-72]   Resp:  [18-20]   BP: (130-155)/(72-78)   SpO2:  [96 %-100 %] .   Home meds for hypertension were reviewed and noted below.   Hypertension Medications               carvediloL (COREG) 12.5 MG tablet Take 12.5 mg by mouth 2 (two) times daily.    furosemide (LASIX) 20 MG tablet Take 20 mg by mouth daily as needed (lower extremity edema).    isosorbide mononitrate (IMDUR) 30 MG 24 hr tablet Take 30 mg by mouth once daily.     -Continue with the above home blood pressure medications as prescribed.   -Follow up with primary care provider as scheduled

## 2024-05-16 NOTE — PLAN OF CARE
Care plan reviewed  Problem: Diabetes Comorbidity  Goal: Blood Glucose Level Within Targeted Range  Outcome: Progressing

## 2024-05-17 VITALS
TEMPERATURE: 98 F | DIASTOLIC BLOOD PRESSURE: 70 MMHG | OXYGEN SATURATION: 99 % | HEART RATE: 66 BPM | SYSTOLIC BLOOD PRESSURE: 124 MMHG | WEIGHT: 134 LBS | BODY MASS INDEX: 24.66 KG/M2 | HEIGHT: 62 IN | RESPIRATION RATE: 21 BRPM

## 2024-05-17 LAB
GLUCOSE SERPL-MCNC: 135 MG/DL (ref 70–105)
GLUCOSE SERPL-MCNC: 182 MG/DL (ref 70–105)

## 2024-05-17 PROCEDURE — 63600175 PHARM REV CODE 636 W HCPCS: Performed by: FAMILY MEDICINE

## 2024-05-17 PROCEDURE — 25000003 PHARM REV CODE 250: Performed by: FAMILY MEDICINE

## 2024-05-17 PROCEDURE — 82962 GLUCOSE BLOOD TEST: CPT

## 2024-05-17 PROCEDURE — 25000003 PHARM REV CODE 250

## 2024-05-17 RX ADMIN — CARVEDILOL 12.5 MG: 6.25 TABLET, FILM COATED ORAL at 08:05

## 2024-05-17 RX ADMIN — PANTOPRAZOLE SODIUM 40 MG: 40 TABLET, DELAYED RELEASE ORAL at 08:05

## 2024-05-17 RX ADMIN — ISOSORBIDE MONONITRATE 30 MG: 30 TABLET, EXTENDED RELEASE ORAL at 08:05

## 2024-05-17 RX ADMIN — SERTRALINE HYDROCHLORIDE 25 MG: 25 TABLET ORAL at 08:05

## 2024-05-17 RX ADMIN — INSULIN GLARGINE 10 UNITS: 100 INJECTION, SOLUTION SUBCUTANEOUS at 08:05

## 2024-05-17 RX ADMIN — ASPIRIN 81 MG: 81 TABLET, COATED ORAL at 08:05

## 2024-05-17 RX ADMIN — LEVOTHYROXINE SODIUM 25 MCG: 25 TABLET ORAL at 05:05

## 2024-05-17 RX ADMIN — SODIUM ZIRCONIUM CYCLOSILICATE 5 G: 5 POWDER, FOR SUSPENSION ORAL at 11:05

## 2024-05-17 NOTE — NURSING
AVS has been reviewed verbally ( with the patient and devyn the daughter)and a printed copy given to the patient. They both voiced understanding of home meds, follow up, and s/s to report/return. [facility] to follow at home

## 2024-05-17 NOTE — PLAN OF CARE
Problem: Adult Inpatient Plan of Care  Goal: Plan of Care Review  Outcome: Met  Goal: Patient-Specific Goal (Individualized)  Outcome: Met  Goal: Absence of Hospital-Acquired Illness or Injury  Outcome: Met  Goal: Optimal Comfort and Wellbeing  Outcome: Met  Goal: Readiness for Transition of Care  Outcome: Met     Problem: Skin Injury Risk Increased  Goal: Skin Health and Integrity  Outcome: Met     Problem: Diabetes Comorbidity  Goal: Blood Glucose Level Within Targeted Range  Outcome: Met     Problem: Fall Injury Risk  Goal: Absence of Fall and Fall-Related Injury  Outcome: Met

## 2024-05-21 ENCOUNTER — PATIENT OUTREACH (OUTPATIENT)
Dept: ADMINISTRATIVE | Facility: CLINIC | Age: 79
End: 2024-05-21

## 2024-05-21 NOTE — PROGRESS NOTES
C3 nurse spoke with Jeanie Lucas  for a TCC post hospital discharge follow up call. The patient has a scheduled HOSFU appointment with Arabella Ann FNP  on 5/23/24 @ 300.    Medication difference: Pt has atorvastatin 80mg 1 po daily in home and has rosuvastatin 40mg 1 po daily listed on AVS medication list. Advised daughter to take medication bottles and dc summary with her to appt with nikhil jackson on 5/23/24 to review and clarify. Isaiah.

## 2024-06-18 ENCOUNTER — LAB REQUISITION (OUTPATIENT)
Dept: LAB | Facility: HOSPITAL | Age: 79
End: 2024-06-18
Attending: NURSE PRACTITIONER
Payer: MEDICARE

## 2024-06-18 DIAGNOSIS — I11.0 HYPERTENSIVE HEART DISEASE WITH HEART FAILURE: ICD-10-CM

## 2024-06-18 LAB — TSH SERPL DL<=0.005 MIU/L-ACNC: 3.71 UIU/ML (ref 0.36–3.74)

## 2024-06-18 PROCEDURE — 84443 ASSAY THYROID STIM HORMONE: CPT | Performed by: NURSE PRACTITIONER

## 2024-06-27 ENCOUNTER — HOSPITAL ENCOUNTER (OUTPATIENT)
Dept: RADIOLOGY | Facility: HOSPITAL | Age: 79
Discharge: HOME | End: 2024-06-27
Attending: NURSE PRACTITIONER
Payer: MEDICARE

## 2024-06-27 DIAGNOSIS — R11.10 VOMITING: ICD-10-CM

## 2024-06-27 PROCEDURE — A9698 NON-RAD CONTRAST MATERIALNOC: HCPCS | Performed by: NURSE PRACTITIONER

## 2024-06-27 PROCEDURE — 74220 X-RAY XM ESOPHAGUS 1CNTRST: CPT | Mod: 26,,, | Performed by: STUDENT IN AN ORGANIZED HEALTH CARE EDUCATION/TRAINING PROGRAM

## 2024-06-27 PROCEDURE — 74220 X-RAY XM ESOPHAGUS 1CNTRST: CPT | Mod: TC

## 2024-06-27 PROCEDURE — 25500020 PHARM REV CODE 255: Performed by: NURSE PRACTITIONER

## 2024-06-27 RX ADMIN — Medication 176 G: at 09:06

## 2024-06-27 RX ADMIN — BARIUM SULFATE: 980 POWDER, FOR SUSPENSION ORAL at 09:06

## 2024-08-15 ENCOUNTER — LAB REQUISITION (OUTPATIENT)
Dept: LAB | Facility: HOSPITAL | Age: 79
End: 2024-08-15
Attending: NURSE PRACTITIONER
Payer: MEDICARE

## 2024-08-15 DIAGNOSIS — I11.0 HYPERTENSIVE HEART DISEASE WITH HEART FAILURE: ICD-10-CM

## 2024-08-15 LAB
BACTERIA #/AREA URNS HPF: ABNORMAL /HPF
BILIRUB UR QL STRIP: NEGATIVE
CLARITY UR: ABNORMAL
COLOR UR: YELLOW
GLUCOSE UR STRIP-MCNC: NEGATIVE MG/DL
KETONES UR STRIP-SCNC: NEGATIVE MG/DL
LEUKOCYTE ESTERASE UR QL STRIP: ABNORMAL
NITRITE UR QL STRIP: NEGATIVE
PH UR STRIP: 6 PH UNITS
PROT UR QL STRIP: 30
RBC # UR STRIP: ABNORMAL /UL
RBC #/AREA URNS HPF: ABNORMAL /HPF
SP GR UR STRIP: 1.01
SQUAMOUS #/AREA URNS LPF: ABNORMAL /LPF
UROBILINOGEN UR STRIP-ACNC: 1 MG/DL
WBC #/AREA URNS HPF: ABNORMAL /HPF

## 2024-08-15 PROCEDURE — 87086 URINE CULTURE/COLONY COUNT: CPT | Performed by: NURSE PRACTITIONER

## 2024-08-15 PROCEDURE — 81003 URINALYSIS AUTO W/O SCOPE: CPT | Performed by: NURSE PRACTITIONER

## 2024-08-15 PROCEDURE — 87077 CULTURE AEROBIC IDENTIFY: CPT | Performed by: NURSE PRACTITIONER

## 2024-08-15 PROCEDURE — 81001 URINALYSIS AUTO W/SCOPE: CPT | Performed by: NURSE PRACTITIONER

## 2024-08-17 LAB — UA COMPLETE W REFLEX CULTURE PNL UR: ABNORMAL

## 2024-09-02 ENCOUNTER — HOSPITAL ENCOUNTER (EMERGENCY)
Facility: HOSPITAL | Age: 79
Discharge: HOME | End: 2024-09-02
Payer: MEDICARE

## 2024-09-02 VITALS
HEART RATE: 72 BPM | RESPIRATION RATE: 21 BRPM | BODY MASS INDEX: 22.08 KG/M2 | DIASTOLIC BLOOD PRESSURE: 87 MMHG | HEIGHT: 62 IN | WEIGHT: 120 LBS | TEMPERATURE: 98 F | SYSTOLIC BLOOD PRESSURE: 136 MMHG | OXYGEN SATURATION: 99 %

## 2024-09-02 DIAGNOSIS — R41.0 DISORIENTATION: Primary | ICD-10-CM

## 2024-09-02 LAB
ANION GAP SERPL CALCULATED.3IONS-SCNC: 11 MMOL/L (ref 7–16)
BASOPHILS # BLD AUTO: 0.04 K/UL (ref 0–0.2)
BASOPHILS NFR BLD AUTO: 1 % (ref 0–1)
BILIRUB UR QL STRIP: NEGATIVE
BUN SERPL-MCNC: 57 MG/DL (ref 7–18)
BUN/CREAT SERPL: 16 (ref 6–20)
CALCIUM SERPL-MCNC: 8.4 MG/DL (ref 8.5–10.1)
CHLORIDE SERPL-SCNC: 107 MMOL/L (ref 98–107)
CLARITY UR: NORMAL
CO2 SERPL-SCNC: 19 MMOL/L (ref 21–32)
COLOR UR: YELLOW
CREAT SERPL-MCNC: 3.53 MG/DL (ref 0.55–1.02)
DIFFERENTIAL METHOD BLD: ABNORMAL
EGFR (NO RACE VARIABLE) (RUSH/TITUS): 13 ML/MIN/1.73M2
EOSINOPHIL # BLD AUTO: 0.27 K/UL (ref 0–0.5)
EOSINOPHIL NFR BLD AUTO: 6.9 % (ref 1–4)
ERYTHROCYTE [DISTWIDTH] IN BLOOD BY AUTOMATED COUNT: 17.8 % (ref 11.5–14.5)
GLUCOSE SERPL-MCNC: 170 MG/DL (ref 74–106)
GLUCOSE UR STRIP-MCNC: NEGATIVE MG/DL
HCT VFR BLD AUTO: 30.2 % (ref 38–47)
HGB BLD-MCNC: 9.8 G/DL (ref 12–16)
KETONES UR STRIP-SCNC: NEGATIVE MG/DL
LEUKOCYTE ESTERASE UR QL STRIP: NEGATIVE
LYMPHOCYTES # BLD AUTO: 0.56 K/UL (ref 1–4.8)
LYMPHOCYTES NFR BLD AUTO: 14.2 % (ref 27–41)
MCH RBC QN AUTO: 29.3 PG (ref 27–31)
MCHC RBC AUTO-ENTMCNC: 32.5 G/DL (ref 32–36)
MCV RBC AUTO: 90.1 FL (ref 80–96)
MONOCYTES # BLD AUTO: 0.49 K/UL (ref 0–0.8)
MONOCYTES NFR BLD AUTO: 12.5 % (ref 2–6)
MPC BLD CALC-MCNC: 9.7 FL (ref 9.4–12.4)
NEUTROPHILS # BLD AUTO: 2.57 K/UL (ref 1.8–7.7)
NEUTROPHILS NFR BLD AUTO: 65.4 % (ref 53–65)
NITRITE UR QL STRIP: NEGATIVE
PH UR STRIP: 6 PH UNITS
PLATELET # BLD AUTO: 116 K/UL (ref 150–400)
POTASSIUM SERPL-SCNC: 4.3 MMOL/L (ref 3.5–5.1)
PROT UR QL STRIP: NEGATIVE
RBC # BLD AUTO: 3.35 M/UL (ref 4.2–5.4)
RBC # UR STRIP: NEGATIVE /UL
SODIUM SERPL-SCNC: 133 MMOL/L (ref 136–145)
SP GR UR STRIP: 1.02
UROBILINOGEN UR STRIP-ACNC: 0.2 MG/DL
WBC # BLD AUTO: 3.93 K/UL (ref 4.5–11)

## 2024-09-02 PROCEDURE — 99284 EMERGENCY DEPT VISIT MOD MDM: CPT | Mod: GF | Performed by: NURSE PRACTITIONER

## 2024-09-02 PROCEDURE — 99285 EMERGENCY DEPT VISIT HI MDM: CPT | Mod: 25

## 2024-09-02 PROCEDURE — 36415 COLL VENOUS BLD VENIPUNCTURE: CPT | Performed by: NURSE PRACTITIONER

## 2024-09-02 PROCEDURE — 81003 URINALYSIS AUTO W/O SCOPE: CPT | Performed by: NURSE PRACTITIONER

## 2024-09-02 PROCEDURE — 25000003 PHARM REV CODE 250: Performed by: NURSE PRACTITIONER

## 2024-09-02 PROCEDURE — 85025 COMPLETE CBC W/AUTO DIFF WBC: CPT | Performed by: NURSE PRACTITIONER

## 2024-09-02 PROCEDURE — 80048 BASIC METABOLIC PNL TOTAL CA: CPT | Performed by: NURSE PRACTITIONER

## 2024-09-02 PROCEDURE — 96360 HYDRATION IV INFUSION INIT: CPT

## 2024-09-02 RX ORDER — SODIUM CHLORIDE 9 MG/ML
1000 INJECTION, SOLUTION INTRAVENOUS
Status: COMPLETED | OUTPATIENT
Start: 2024-09-02 | End: 2024-09-02

## 2024-09-02 RX ADMIN — SODIUM CHLORIDE 1000 ML: 9 INJECTION, SOLUTION INTRAVENOUS at 05:09

## 2024-09-02 NOTE — ED PROVIDER NOTES
Encounter Date: 9/2/2024       History     Chief Complaint   Patient presents with    Altered Mental Status     Lethargy and confusion onset  4 days ago, received total of 2 rocephin shots over last week for UTI     LB is a 77 y/o BF with a PMH of diabetes, CHF, hypertension, Parkinson's disease, and stage IV CKD who presents today with daughter for concerns regarding altered mental status.  Her daughter endorses that she was recently treated for UTI about 2 weeks ago with IM Rocephin. She is currently under the care of neurology, and they have been attempting to schedule an MRI for about 3 weeks. They are having difficulty due to the presence of an internal defibrillator.     She is currently under the care of [facility] for home health care services.    The history is provided by a caregiver. The history is limited by the condition of the patient. No  was used.     Review of patient's allergies indicates:   Allergen Reactions    Cefuroxime Swelling     Past Medical History:   Diagnosis Date    Diabetes mellitus, type 2     Heart disease     Hypertension     Parkinson disease     Urinary tract infection, site not specified      Past Surgical History:   Procedure Laterality Date    CARDIAC DEFIBRILLATOR PLACEMENT  2018    COMBINED RIGHT AND TRANSSEPTAL (EXISTING OPENING) LEFT HEART CATHETERIZATION      defibrilator      HYSTERECTOMY      OOPHORECTOMY      REMOVAL OF HARDWARE FROM LOWER EXTREMITY Right 06/10/2021    Procedure: REMOVAL, HARDWARE, LOWER EXTREMITY, PATELLA;  Surgeon: Artis Monaco MD;  [facility];  Service: Orthopedics;  Laterality: Right;     Family History   Problem Relation Name Age of Onset    Diabetes Mother      Heart disease Mother      Heart disease Father       Social History     Tobacco Use    Smoking status: Never     Passive exposure: Never    Smokeless tobacco: Never   Substance Use Topics    Alcohol use: Never    Drug use: Never     Review  of Systems   Unable to perform ROS: Mental status change   Psychiatric/Behavioral:  Positive for confusion.        Physical Exam     Initial Vitals [09/02/24 1504]   BP Pulse Resp Temp SpO2   (!) 146/77 73 19 98 °F (36.7 °C) 100 %      MAP       --         Physical Exam    Vitals reviewed.  Constitutional: Vital signs are normal. She appears well-developed.   HENT:   Head: Normocephalic and atraumatic.   Right Ear: Hearing normal.   Left Ear: Hearing normal.   Nose: Nose normal.   Mouth/Throat: Mucous membranes are normal.   Eyes: Conjunctivae and lids are normal. Right eye exhibits no nystagmus. Left eye exhibits no nystagmus. Pupils are unequal.   Neck: Trachea normal.   Normal range of motion.  Cardiovascular:  Normal rate and regular rhythm.           Murmur heard.  Pulmonary/Chest: Breath sounds normal.   Abdominal: Abdomen is soft.   Had bowel movement while in ER   Musculoskeletal:      Cervical back: Normal range of motion.     Neurological: She is alert. She is disoriented. She exhibits abnormal muscle tone. Coordination abnormal. Gait normal.   Skin: Skin is warm.   Psychiatric: She has a normal mood and affect. Thought content normal. Her speech is delayed. She is slowed and withdrawn. Cognition and memory are impaired. She expresses inappropriate judgment. She is inattentive.         Medical Screening Exam   See Full Note    ED Course   Procedures  Labs Reviewed   BASIC METABOLIC PANEL - Abnormal       Result Value    Sodium 133 (*)     Potassium 4.3      Chloride 107      CO2 19 (*)     Anion Gap 11      Glucose 170 (*)     BUN 57 (*)     Creatinine 3.53 (*)     BUN/Creatinine Ratio 16      Calcium 8.4 (*)     eGFR 13 (*)    CBC WITH DIFFERENTIAL - Abnormal    WBC 3.93 (*)     RBC 3.35 (*)     Hemoglobin 9.8 (*)     Hematocrit 30.2 (*)     MCV 90.1      MCH 29.3      MCHC 32.5      RDW 17.8 (*)     Platelet Count 116 (*)     MPV 9.7      Neutrophils % 65.4 (*)     Lymphocytes % 14.2 (*)     Neutrophils,  Abs 2.57      Lymphocytes, Absolute 0.56 (*)     Diff Type Auto      Monocytes % 12.5 (*)     Eosinophils % 6.9 (*)     Basophils % 1.0      Monocytes, Absolute 0.49      Eosinophils, Absolute 0.27      Basophils, Absolute 0.04     URINALYSIS    Color, UA Yellow      Clarity, UA Slightly Cloudy      pH, UA 6.0      Leukocytes, UA Negative      Nitrites, UA Negative      Protein, UA Negative      Glucose, UA Negative      Ketones, UA Negative      Urobilinogen, UA 0.2      Bilirubin, UA Negative      Blood, UA Negative      Specific Gravity, UA 1.020     CBC W/ AUTO DIFFERENTIAL    Narrative:     The following orders were created for panel order CBC auto differential.  Procedure                               Abnormality         Status                     ---------                               -----------         ------                     CBC with Differential[[id number]]       Abnormal            Final result                 Please view results for these tests on the individual orders.          Imaging Results              CT Head Without Contrast (Final result)  Result time 09/02/24 16:52:13      Final result by Molina Conklin MD (09/02/24 16:52:13)                   Impression:      No acute intracranial process.  Additional evaluation, as clinically warranted.    Changes chronic vessel ischemic disease and cerebral volume loss.      Electronically signed by: Molina Conklin MD  Date:    09/02/2024  Time:    16:52               Narrative:    EXAMINATION:  CT HEAD WITHOUT CONTRAST    CLINICAL HISTORY:  Mental status change, unknown cause;    TECHNIQUE:  Low dose axial images were obtained through the head.  Coronal and sagittal reformations were also performed. Contrast was not administered.    COMPARISON:  None.    FINDINGS:  The subcutaneous tissue unremarkable.  The bony calvarium is intact.  The paranasal sinuses are unremarkable.  The mastoids are clear.  There are postoperative changes in the globes.    The  craniocervical junction is intact.  The sellar and parasellar structures are unremarkable.  There is no evidence of intracranial hemorrhage.  There are no extra-axial fluid collections.    The ventricles and sulci are prominent, consistent cerebral volume loss.  There are hypodensities within the periventricular and subcortical white matter.  There are calcifications in the globus pallidus and the len.  The gray-white differentiation is maintained.  There is no dense vessel sign.  There is no evidence of mass effect.                                       Medications   0.9%  NaCl infusion (0 mLs Intravenous Stopped 9/2/24 1820)     Medical Decision Making  Amount and/or Complexity of Data Reviewed  Labs: ordered. Decision-making details documented in ED Course.  Radiology: ordered. Decision-making details documented in ED Course.     Details: Discussed imaging results with daughter.  Her that changes are likely age related.  Instructed daughter to keep follow up appointments with Neurology since there is an MRI pending.    Risk  Prescription drug management.  Risk Details: Differential diagnoses include dementia, acute stroke, or subdural hematoma.               ED Course as of 09/02/24 1907   Mon Sep 02, 2024   1547 Urinalysis Catheterized  Nitrites and leukocytes negative [MM]   1548 WBC(!): 3.93  Leukopenia [MM]   1549 RBC(!): 3.35 [MM]   1549 Hemoglobin(!): 9.8 [MM]   1549 Hematocrit(!): 30.2  Anemia; currently taking oral iron replacement; also has stage IV CKD and is under the care of Dr. Harrison [MM]   1604 Creatinine(!): 3.53 [MM]   1604 eGFR(!): 13 [MM]   1604 Slightly worsening renal function; previous EGFR:16; previous creatinine 2.88   [MM]   1708 CT Head Without Contrast  Consistent with chronic changes due to aging; patient needs follow up MRI which is pending by neurology [MM]      ED Course User Index  [MM] Riri Jung FNP            Clinical Impression:   Final diagnoses:  [R41.0]  Disorientation (Primary)        ED Disposition Condition    Discharge Stable          ED Prescriptions    None       Follow-up Information       Follow up With Specialties Details Why Contact Info    Arabella Ann, [facility] Schedule an appointment as soon as possible for a visit   [address] [place] [address]  [phone]               Riri Jung FNP  09/02/24 1763

## 2024-09-05 ENCOUNTER — OUTSIDE PLACE OF SERVICE (OUTPATIENT)
Dept: VASCULAR SURGERY | Facility: CLINIC | Age: 79
End: 2024-09-05
Payer: MEDICARE

## 2024-09-05 PROCEDURE — 99222 1ST HOSP IP/OBS MODERATE 55: CPT | Mod: ,,, | Performed by: NURSE PRACTITIONER

## 2024-09-06 ENCOUNTER — OUTSIDE PLACE OF SERVICE (OUTPATIENT)
Dept: VASCULAR SURGERY | Facility: CLINIC | Age: 79
End: 2024-09-06
Payer: MEDICARE

## 2024-09-06 PROCEDURE — 99232 SBSQ HOSP IP/OBS MODERATE 35: CPT | Mod: ,,, | Performed by: NURSE PRACTITIONER

## (undated) DEVICE — GLOVE SURGICAL PROTEXIS PI BLUE SIZE 8.5

## (undated) DEVICE — STAPLER SKIN PROXIMATE PLUS MD 35 REG DISP

## (undated) DEVICE — APPLICATOR CHLORAPREP HI-LITE TINTED ORANGE 26ML

## (undated) DEVICE — ESMARK BANDAGE 6INX3YD.

## (undated) DEVICE — GOWN SURGICAL STERILE LEVEL 3 / XX-LARGE

## (undated) DEVICE — TOURNIQUET CUFF DISP QC 34 INCH

## (undated) DEVICE — GLOVE SURGICAL PROTEXIS PI BLUE SIZE 7.5

## (undated) DEVICE — CDS EXTREMITY

## (undated) DEVICE — SOL IRRIGATION SALINE 0.9% 1000ML BOTTLE

## (undated) DEVICE — SUTURE VICRYL 2-0 CT-1 27"

## (undated) DEVICE — DRAPE FLUORO C ARM 36X30IN

## (undated) DEVICE — GLOVE SURGICAL PROTEXIS PI CLASSIC SIZE 7.5

## (undated) DEVICE — PADDING CAST SPECIALIST SYNTHETIC 4IN X 4YD STERILE

## (undated) DEVICE — GLOVE SURGICAL PROTEXIS PI CLASSIC SIZE 8.0